# Patient Record
Sex: FEMALE | Race: WHITE | NOT HISPANIC OR LATINO | URBAN - METROPOLITAN AREA
[De-identification: names, ages, dates, MRNs, and addresses within clinical notes are randomized per-mention and may not be internally consistent; named-entity substitution may affect disease eponyms.]

---

## 2017-01-08 ENCOUNTER — INPATIENT (INPATIENT)
Facility: HOSPITAL | Age: 82
LOS: 1 days | Discharge: HOME | DRG: 194 | End: 2017-01-10
Attending: INTERNAL MEDICINE | Admitting: INTERNAL MEDICINE
Payer: MEDICARE

## 2017-01-08 VITALS
TEMPERATURE: 98 F | DIASTOLIC BLOOD PRESSURE: 99 MMHG | OXYGEN SATURATION: 97 % | RESPIRATION RATE: 18 BRPM | HEART RATE: 95 BPM | SYSTOLIC BLOOD PRESSURE: 156 MMHG

## 2017-01-08 LAB
ALBUMIN SERPL ELPH-MCNC: 3.8 G/DL — SIGNIFICANT CHANGE UP (ref 3.4–5)
ALP SERPL-CCNC: 60 U/L — SIGNIFICANT CHANGE UP (ref 40–120)
ALT FLD-CCNC: 33 U/L — SIGNIFICANT CHANGE UP (ref 12–42)
ANION GAP SERPL CALC-SCNC: 8 MMOL/L — LOW (ref 9–16)
APTT BLD: 27.4 SEC — LOW (ref 27.5–37.4)
AST SERPL-CCNC: 56 U/L — HIGH (ref 15–37)
BASOPHILS NFR BLD AUTO: 0.1 % — SIGNIFICANT CHANGE UP (ref 0–2)
BILIRUB SERPL-MCNC: 1 MG/DL — SIGNIFICANT CHANGE UP (ref 0.2–1.2)
BUN SERPL-MCNC: 13 MG/DL — SIGNIFICANT CHANGE UP (ref 7–23)
CALCIUM SERPL-MCNC: 8.6 MG/DL — SIGNIFICANT CHANGE UP (ref 8.5–10.5)
CHLORIDE SERPL-SCNC: 88 MMOL/L — LOW (ref 96–108)
CO2 SERPL-SCNC: 32 MMOL/L — HIGH (ref 22–31)
CREAT SERPL-MCNC: 0.6 MG/DL — SIGNIFICANT CHANGE UP (ref 0.5–1.3)
GLUCOSE SERPL-MCNC: 153 MG/DL — HIGH (ref 70–99)
HCT VFR BLD CALC: 42.1 % — SIGNIFICANT CHANGE UP (ref 34.5–45)
HGB BLD-MCNC: 15.3 G/DL — SIGNIFICANT CHANGE UP (ref 11.5–15.5)
INR BLD: 1.1 — SIGNIFICANT CHANGE UP (ref 0.88–1.16)
LIDOCAIN IGE QN: 164 U/L — SIGNIFICANT CHANGE UP (ref 73–393)
LYMPHOCYTES # BLD AUTO: 2.1 % — LOW (ref 13–44)
MCHC RBC-ENTMCNC: 32.8 PG — SIGNIFICANT CHANGE UP (ref 27–34)
MCHC RBC-ENTMCNC: 36.3 G/DL — HIGH (ref 32–36)
MCV RBC AUTO: 90.3 FL — SIGNIFICANT CHANGE UP (ref 80–100)
MONOCYTES NFR BLD AUTO: 2.6 % — SIGNIFICANT CHANGE UP (ref 2–14)
NEUTROPHILS NFR BLD AUTO: 95.2 % — HIGH (ref 43–77)
NT-PROBNP SERPL-SCNC: 655 PG/ML — HIGH
PLATELET # BLD AUTO: 233 K/UL — SIGNIFICANT CHANGE UP (ref 150–400)
POTASSIUM SERPL-MCNC: 4.2 MMOL/L — SIGNIFICANT CHANGE UP (ref 3.5–5.3)
POTASSIUM SERPL-SCNC: 4.2 MMOL/L — SIGNIFICANT CHANGE UP (ref 3.5–5.3)
PROT SERPL-MCNC: 7.3 G/DL — SIGNIFICANT CHANGE UP (ref 6.4–8.2)
PROTHROM AB SERPL-ACNC: 12.2 SEC — SIGNIFICANT CHANGE UP (ref 10–13.1)
RBC # BLD: 4.66 M/UL — SIGNIFICANT CHANGE UP (ref 3.8–5.2)
RBC # FLD: 12.2 % — SIGNIFICANT CHANGE UP (ref 10.3–16.9)
SODIUM SERPL-SCNC: 128 MMOL/L — LOW (ref 135–145)
TROPONIN I SERPL-MCNC: <0.015 NG/ML — SIGNIFICANT CHANGE UP (ref 0.01–0.04)
WBC # BLD: 22.2 K/UL — HIGH (ref 3.8–10.5)
WBC # FLD AUTO: 22.2 K/UL — HIGH (ref 3.8–10.5)

## 2017-01-08 PROCEDURE — 99285 EMERGENCY DEPT VISIT HI MDM: CPT | Mod: 25

## 2017-01-08 PROCEDURE — 93010 ELECTROCARDIOGRAM REPORT: CPT | Mod: 77

## 2017-01-08 PROCEDURE — 71010: CPT | Mod: 26

## 2017-01-08 RX ORDER — SODIUM CHLORIDE 9 MG/ML
3 INJECTION INTRAMUSCULAR; INTRAVENOUS; SUBCUTANEOUS ONCE
Qty: 0 | Refills: 0 | Status: COMPLETED | OUTPATIENT
Start: 2017-01-08 | End: 2017-01-08

## 2017-01-08 RX ORDER — SODIUM CHLORIDE 9 MG/ML
1000 INJECTION INTRAMUSCULAR; INTRAVENOUS; SUBCUTANEOUS
Qty: 0 | Refills: 0 | Status: DISCONTINUED | OUTPATIENT
Start: 2017-01-08 | End: 2017-01-09

## 2017-01-08 RX ORDER — AZITHROMYCIN 500 MG/1
500 TABLET, FILM COATED ORAL ONCE
Qty: 0 | Refills: 0 | Status: COMPLETED | OUTPATIENT
Start: 2017-01-08 | End: 2017-01-08

## 2017-01-08 RX ORDER — CEFTRIAXONE 500 MG/1
1 INJECTION, POWDER, FOR SOLUTION INTRAMUSCULAR; INTRAVENOUS ONCE
Qty: 0 | Refills: 0 | Status: COMPLETED | OUTPATIENT
Start: 2017-01-08 | End: 2017-01-08

## 2017-01-08 RX ADMIN — SODIUM CHLORIDE 75 MILLILITER(S): 9 INJECTION INTRAMUSCULAR; INTRAVENOUS; SUBCUTANEOUS at 23:42

## 2017-01-08 RX ADMIN — AZITHROMYCIN 255 MILLIGRAM(S): 500 TABLET, FILM COATED ORAL at 23:42

## 2017-01-08 RX ADMIN — SODIUM CHLORIDE 3 MILLILITER(S): 9 INJECTION INTRAMUSCULAR; INTRAVENOUS; SUBCUTANEOUS at 22:55

## 2017-01-08 RX ADMIN — CEFTRIAXONE 100 GRAM(S): 500 INJECTION, POWDER, FOR SOLUTION INTRAMUSCULAR; INTRAVENOUS at 23:10

## 2017-01-08 NOTE — ED PROVIDER NOTE - MEDICAL DECISION MAKING DETAILS
89F with cough, difficulty breathing. Vitals notable for mild tachycardia, mild hypoxia en route. 89F with cough, difficulty breathing. Vitals notable for mild tachycardia, mild hypoxia en route. Likely PNA, 89F with cough, difficulty breathing. Vitals notable for mild tachycardia, mild hypoxia en route. Likely PNA, plan for IV abx after blood cultures, admitted to [facility]. Pt reports chest pain, unclear onset/duration given dementia, EKG shows old LBBB, plan for trop.

## 2017-01-08 NOTE — ED ADULT NURSE NOTE - OBJECTIVE STATEMENT
Pt received to er into room 5. BIBA with HHA at bedside. As per HHA pt has been having some changes in breathing pattern accompanied by a cough. Pt has a hx of dementia. Pt afebrile at this time. Slight wet cough noted. pt A&O x2 to place and person. Pt seems comfortable, not in any respiratory distress. Pt desaturates off of oxygen down to 90's. On oxygen saturation increases. Pt able to move all extremities without difficulty and upon command. AS per HHA radames Chaparro is the HCP waiting for advance directives paperwork from son. Will cont to monitor pt. Pt received to er into room 5. BIBA with HHA at bedside. As per HHA pt has been having some changes in breathing pattern accompanied by a cough. Pt has a hx of dementia. Placed on CM. Pt afebrile at this time. Slight wet cough noted. pt A&O x2 to place and person. Pt seems comfortable, not in any respiratory distress. Pt desaturates off of oxygen down to 90's. On oxygen saturation increases. Pt able to move all extremities without difficulty and upon command. AS per HHA radames Chaparro is the HCP waiting for advance directives paperwork from son. Will cont to monitor pt.

## 2017-01-08 NOTE — ED PROVIDER NOTE - OBJECTIVE STATEMENT
89F with no past medical hx other than dementia, does not take any medications presenting with cough for 1 week, shortness of breath noted today. Pt denies any complaints.

## 2017-01-08 NOTE — ED ADULT TRIAGE NOTE - CHIEF COMPLAINT QUOTE
pt from home by EMS, hx Dementia, according to homeaide difficulty breathing, pox 90% on r/a, NC  3 L 97%, no distress during triage

## 2017-01-09 DIAGNOSIS — E86.0 DEHYDRATION: ICD-10-CM

## 2017-01-09 DIAGNOSIS — I42.9 CARDIOMYOPATHY, UNSPECIFIED: ICD-10-CM

## 2017-01-09 DIAGNOSIS — E87.3 ALKALOSIS: ICD-10-CM

## 2017-01-09 DIAGNOSIS — J18.9 PNEUMONIA, UNSPECIFIED ORGANISM: ICD-10-CM

## 2017-01-09 DIAGNOSIS — R73.9 HYPERGLYCEMIA, UNSPECIFIED: ICD-10-CM

## 2017-01-09 DIAGNOSIS — Z41.8 ENCOUNTER FOR OTHER PROCEDURES FOR PURPOSES OTHER THAN REMEDYING HEALTH STATE: ICD-10-CM

## 2017-01-09 DIAGNOSIS — D72.829 ELEVATED WHITE BLOOD CELL COUNT, UNSPECIFIED: ICD-10-CM

## 2017-01-09 DIAGNOSIS — E87.1 HYPO-OSMOLALITY AND HYPONATREMIA: ICD-10-CM

## 2017-01-09 DIAGNOSIS — R63.8 OTHER SYMPTOMS AND SIGNS CONCERNING FOOD AND FLUID INTAKE: ICD-10-CM

## 2017-01-09 DIAGNOSIS — F03.90 UNSPECIFIED DEMENTIA, UNSPECIFIED SEVERITY, WITHOUT BEHAVIORAL DISTURBANCE, PSYCHOTIC DISTURBANCE, MOOD DISTURBANCE, AND ANXIETY: ICD-10-CM

## 2017-01-09 LAB
ANION GAP SERPL CALC-SCNC: 11 MMOL/L — SIGNIFICANT CHANGE UP (ref 9–16)
ANION GAP SERPL CALC-SCNC: 8 MMOL/L — LOW (ref 9–16)
APPEARANCE UR: (no result)
BILIRUB UR-MCNC: NEGATIVE — SIGNIFICANT CHANGE UP
BUN SERPL-MCNC: 13 MG/DL — SIGNIFICANT CHANGE UP (ref 7–23)
BUN SERPL-MCNC: 14 MG/DL — SIGNIFICANT CHANGE UP (ref 7–23)
CALCIUM SERPL-MCNC: 7.9 MG/DL — LOW (ref 8.5–10.5)
CALCIUM SERPL-MCNC: 8.2 MG/DL — LOW (ref 8.5–10.5)
CHLORIDE SERPL-SCNC: 91 MMOL/L — LOW (ref 96–108)
CHLORIDE SERPL-SCNC: 93 MMOL/L — LOW (ref 96–108)
CK MB CFR SERPL CALC: 5.2 NG/ML — HIGH (ref 0.5–3.6)
CK SERPL-CCNC: 311 U/L — HIGH (ref 26–192)
CO2 SERPL-SCNC: 26 MMOL/L — SIGNIFICANT CHANGE UP (ref 22–31)
CO2 SERPL-SCNC: 32 MMOL/L — HIGH (ref 22–31)
COLOR SPEC: YELLOW — SIGNIFICANT CHANGE UP
CREAT SERPL-MCNC: 0.45 MG/DL — LOW (ref 0.5–1.3)
CREAT SERPL-MCNC: 0.5 MG/DL — SIGNIFICANT CHANGE UP (ref 0.5–1.3)
DIFF PNL FLD: (no result)
GLUCOSE SERPL-MCNC: 76 MG/DL — SIGNIFICANT CHANGE UP (ref 70–99)
GLUCOSE SERPL-MCNC: 83 MG/DL — SIGNIFICANT CHANGE UP (ref 70–99)
GLUCOSE UR QL: NEGATIVE — SIGNIFICANT CHANGE UP
HCT VFR BLD CALC: 39.9 % — SIGNIFICANT CHANGE UP (ref 34.5–45)
HGB BLD-MCNC: 14 G/DL — SIGNIFICANT CHANGE UP (ref 11.5–15.5)
KETONES UR-MCNC: NEGATIVE — SIGNIFICANT CHANGE UP
LEGIONELLA AG UR QL: NEGATIVE — SIGNIFICANT CHANGE UP
LEUKOCYTE ESTERASE UR-ACNC: (no result)
LYMPHOCYTES # BLD AUTO: 2 % — LOW (ref 13–44)
MAGNESIUM SERPL-MCNC: 1.5 MG/DL — LOW (ref 1.6–2.4)
MAGNESIUM SERPL-MCNC: 1.8 MG/DL — SIGNIFICANT CHANGE UP (ref 1.6–2.4)
MCHC RBC-ENTMCNC: 33.7 PG — SIGNIFICANT CHANGE UP (ref 27–34)
MCHC RBC-ENTMCNC: 35.1 G/DL — SIGNIFICANT CHANGE UP (ref 32–36)
MCV RBC AUTO: 95.9 FL — SIGNIFICANT CHANGE UP (ref 80–100)
MONOCYTES NFR BLD AUTO: 1 % — LOW (ref 2–14)
NEUTROPHILS NFR BLD AUTO: 90 % — HIGH (ref 43–77)
NITRITE UR-MCNC: NEGATIVE — SIGNIFICANT CHANGE UP
PH UR: 7 — SIGNIFICANT CHANGE UP (ref 4–8)
PLATELET # BLD AUTO: 164 K/UL — SIGNIFICANT CHANGE UP (ref 150–400)
POTASSIUM SERPL-MCNC: 3.4 MMOL/L — LOW (ref 3.5–5.3)
POTASSIUM SERPL-MCNC: 4.3 MMOL/L — SIGNIFICANT CHANGE UP (ref 3.5–5.3)
POTASSIUM SERPL-SCNC: 3.4 MMOL/L — LOW (ref 3.5–5.3)
POTASSIUM SERPL-SCNC: 4.3 MMOL/L — SIGNIFICANT CHANGE UP (ref 3.5–5.3)
PROT UR-MCNC: NEGATIVE MG/DL — SIGNIFICANT CHANGE UP
RAPID RVP RESULT: SIGNIFICANT CHANGE UP
RBC # BLD: 4.16 M/UL — SIGNIFICANT CHANGE UP (ref 3.8–5.2)
RBC # FLD: 13.4 % — SIGNIFICANT CHANGE UP (ref 10.3–16.9)
SODIUM SERPL-SCNC: 130 MMOL/L — LOW (ref 135–145)
SODIUM SERPL-SCNC: 131 MMOL/L — LOW (ref 135–145)
SP GR SPEC: 1.02 — SIGNIFICANT CHANGE UP (ref 1–1.03)
TROPONIN I SERPL-MCNC: <0.015 NG/ML — SIGNIFICANT CHANGE UP (ref 0.01–0.04)
TSH SERPL-MCNC: 2.01 UIU/ML — SIGNIFICANT CHANGE UP (ref 0.35–4.94)
UROBILINOGEN FLD QL: 0.2 E.U./DL — SIGNIFICANT CHANGE UP
WBC # BLD: 25.8 K/UL — HIGH (ref 3.8–10.5)
WBC # FLD AUTO: 25.8 K/UL — HIGH (ref 3.8–10.5)

## 2017-01-09 PROCEDURE — 93010 ELECTROCARDIOGRAM REPORT: CPT

## 2017-01-09 RX ORDER — POTASSIUM CHLORIDE 20 MEQ
40 PACKET (EA) ORAL ONCE
Qty: 0 | Refills: 0 | Status: COMPLETED | OUTPATIENT
Start: 2017-01-09 | End: 2017-01-09

## 2017-01-09 RX ORDER — SODIUM CHLORIDE 9 MG/ML
1000 INJECTION INTRAMUSCULAR; INTRAVENOUS; SUBCUTANEOUS
Qty: 0 | Refills: 0 | Status: DISCONTINUED | OUTPATIENT
Start: 2017-01-09 | End: 2017-01-09

## 2017-01-09 RX ORDER — AZITHROMYCIN 500 MG/1
250 TABLET, FILM COATED ORAL EVERY 24 HOURS
Qty: 0 | Refills: 0 | Status: DISCONTINUED | OUTPATIENT
Start: 2017-01-09 | End: 2017-01-09

## 2017-01-09 RX ORDER — CEFTRIAXONE 500 MG/1
1 INJECTION, POWDER, FOR SOLUTION INTRAMUSCULAR; INTRAVENOUS EVERY 24 HOURS
Qty: 0 | Refills: 0 | Status: DISCONTINUED | OUTPATIENT
Start: 2017-01-09 | End: 2017-01-10

## 2017-01-09 RX ORDER — SODIUM CHLORIDE 9 MG/ML
1000 INJECTION, SOLUTION INTRAVENOUS
Qty: 0 | Refills: 0 | Status: DISCONTINUED | OUTPATIENT
Start: 2017-01-09 | End: 2017-01-09

## 2017-01-09 RX ORDER — SODIUM CHLORIDE 9 MG/ML
1000 INJECTION INTRAMUSCULAR; INTRAVENOUS; SUBCUTANEOUS
Qty: 0 | Refills: 0 | Status: DISCONTINUED | OUTPATIENT
Start: 2017-01-09 | End: 2017-01-10

## 2017-01-09 RX ORDER — MAGNESIUM SULFATE 500 MG/ML
2 VIAL (ML) INJECTION
Qty: 0 | Refills: 0 | Status: COMPLETED | OUTPATIENT
Start: 2017-01-09 | End: 2017-01-09

## 2017-01-09 RX ORDER — HEPARIN SODIUM 5000 [USP'U]/ML
5000 INJECTION INTRAVENOUS; SUBCUTANEOUS EVERY 12 HOURS
Qty: 0 | Refills: 0 | Status: DISCONTINUED | OUTPATIENT
Start: 2017-01-09 | End: 2017-01-10

## 2017-01-09 RX ADMIN — SODIUM CHLORIDE 60 MILLILITER(S): 9 INJECTION, SOLUTION INTRAVENOUS at 04:53

## 2017-01-09 RX ADMIN — HEPARIN SODIUM 5000 UNIT(S): 5000 INJECTION INTRAVENOUS; SUBCUTANEOUS at 07:19

## 2017-01-09 RX ADMIN — Medication 40 MILLIEQUIVALENT(S): at 05:06

## 2017-01-09 RX ADMIN — HEPARIN SODIUM 5000 UNIT(S): 5000 INJECTION INTRAVENOUS; SUBCUTANEOUS at 18:04

## 2017-01-09 RX ADMIN — Medication 50 GRAM(S): at 09:23

## 2017-01-09 RX ADMIN — Medication 50 GRAM(S): at 07:19

## 2017-01-09 RX ADMIN — Medication 100 MILLIGRAM(S): at 23:08

## 2017-01-09 RX ADMIN — CEFTRIAXONE 100 GRAM(S): 500 INJECTION, POWDER, FOR SOLUTION INTRAMUSCULAR; INTRAVENOUS at 23:16

## 2017-01-09 NOTE — DIETITIAN INITIAL EVALUATION ADULT. - OTHER INFO
pt admitted for metabolic acidosis. pt is currently on Regular diet. RN is unsure how much pt has been consuming as diet order was just placed today and prior RN reports pt was NPO. RN reports that pt passed bedside dysphagia screen; no reported issues chewing/swallowing or GI distress reported @ this time per RN. Skin: pt with stage II Sacrum PU. NKFA per EMR. High Nutrition Risk.

## 2017-01-09 NOTE — H&P ADULT. - RS GEN PE MLT RESP DETAILS PC
clear to auscultation bilaterally/good air movement/no intercostal retractions/respirations non-labored/airway patent

## 2017-01-09 NOTE — DIETITIAN INITIAL EVALUATION ADULT. - ENERGY NEEDS
Height: 4 feet 5 inches, Weight (Current): 75 pounds, IBW 86 pounds +/-10%, %IBW %87, BMI 18.8    current body wt used for energy calculations as pt falls within % IBW  EER based on PU.

## 2017-01-09 NOTE — H&P ADULT. - CONSTITUTIONAL COMMENTS
cachectic appearing elderly female, very hard of hearing, following commands appropriately, in no distress, profoundly dry.

## 2017-01-09 NOTE — CHART NOTE - NSCHARTNOTEFT_GEN_A_CORE
Spoke with Shankar CASTILLO and he has documentation for DNR/DNI with no artificial life support including nutrition/feeding tube orders that he plans on bringing in later in the evening. Patient will be DNR/DNI.

## 2017-01-09 NOTE — H&P ADULT. - EXTREMITIES COMMENTS
extensive scaling with what appears to be color changes consistent with venous stasis b/l, with multiple healing ulcers/wounds at different stages without any acute appearing lesion.

## 2017-01-09 NOTE — H&P ADULT. - PROBLEM SELECTOR PLAN 2
Cannot rule out underlying infection. CXR difficult to assess and  urine workup pending.  - Ceftriaxone azithromycin for now; f/u culture and sensitivity, UA, RVP  - Legionella ag given possible pna with hyponatremia, soft stool Cannot rule out underlying infection. CXR difficult to assess and  urine workup pending. possible sources also include sacral ulcer and viral etiology.  - Ceftriaxone azithromycin for now; f/u culture and sensitivity, UA, RVP  - Legionella ag given possible pna with hyponatremia, soft stool

## 2017-01-09 NOTE — PROGRESS NOTE ADULT - PROBLEM SELECTOR PLAN 4
Idiopathic Cardiomyopathy with unknown EF. No need for further work up at this time as patient is very frail and would avoid medications and treatments.

## 2017-01-09 NOTE — H&P ADULT. - HISTORY OF PRESENT ILLNESS
89 F PMHx significant for Sinus arrhythmia, LBBB, advanced dementia brought in by home health aide after pt complained of cough, SOB starting yesterday with associated weakness. The patient, although very hard of hearing, is able to deny acute pain, and admits to a good appetite At baseline the pt is assisted out of bed with assist but is non ambulatory. No recent hospitalizations, fever, chills, vomiting, nausea, diarrhea, decreased PO intake per Aide, who has known her 5 years.    ED Vitals: 98.0 / 95 / 156/99 / 18 / 97%  ED Adminisration: Azithromycin, Ceftriaxone, 1 L NS 89 F PMHx significant for Sinus arrhythmia, LBBB, advanced dementia with 24hr HHA brought in by home health aide after pt complained of cough, SOB starting yesterday with associated weakness. The patient, although very hard of hearing, is able to deny acute pain, and admits to a good appetite At baseline the pt is assisted out of bed with assist but is non ambulatory. No recent hospitalizations, fever, chills, vomiting, nausea, diarrhea, decreased PO intake per Aide, who has known her 5 years.    Spoke with HCP Shankar at [phone] who was able to confirm DNR, was unclear about intubation, who will bring paperwork tomorrow. He was also able to confirm that the patient is not taking any Rx medications.    ED Vitals: 98.0 / 95 / 156/99 / 18 / 97%  ED Adminisration: Azithromycin, Ceftriaxone, 1 L NS

## 2017-01-09 NOTE — DIETITIAN INITIAL EVALUATION ADULT. - PROBLEM SELECTOR PLAN 2
Cannot rule out underlying infection. CXR difficult to assess and  urine workup pending. possible sources also include sacral ulcer and viral etiology.  - Ceftriaxone azithromycin for now; f/u culture and sensitivity, UA, RVP  - Legionella ag given possible pna with hyponatremia, soft stool

## 2017-01-09 NOTE — H&P ADULT. - PROBLEM SELECTOR PLAN 1
Suspect contraction alkalosis due to profound dehydration in the setting of clinical findings and hyponatremia.  - Urine chloride to narrow DDx

## 2017-01-09 NOTE — ADVANCED PRACTICE NURSE CONSULT - REASON FOR CONSULT
WOC nurse consult to assess stage 2 sacral pressure ulcer (injury). Patient is a 89 F PMHx significant for sinus arrhythmia, LBBB, advanced dementia with 24hr HHA brought in by home health aide after pt complained of cough, SOB starting  with associated weakness.

## 2017-01-09 NOTE — H&P ADULT. - COMMENTS
limited/unreliable ros as pt with dementia limited/unreliable ros as pt with dementia    +++ Stage 2-3 pressure ulcer 3x4cm

## 2017-01-09 NOTE — ADVANCED PRACTICE NURSE CONSULT - ASSESSMENT
Patient refused assessment of sacral ulcer (injury) stating "leave me alone". Informed RN, Joann and MADISYNMDhara of patient's refusal. Consult can be requested again if patient/family allows assessment.

## 2017-01-10 VITALS
RESPIRATION RATE: 17 BRPM | TEMPERATURE: 98 F | HEART RATE: 76 BPM | DIASTOLIC BLOOD PRESSURE: 93 MMHG | OXYGEN SATURATION: 95 % | SYSTOLIC BLOOD PRESSURE: 143 MMHG

## 2017-01-10 LAB
ANION GAP SERPL CALC-SCNC: 9 MMOL/L — SIGNIFICANT CHANGE UP (ref 9–16)
BUN SERPL-MCNC: 12 MG/DL — SIGNIFICANT CHANGE UP (ref 7–23)
CALCIUM SERPL-MCNC: 8.2 MG/DL — LOW (ref 8.5–10.5)
CHLORIDE SERPL-SCNC: 98 MMOL/L — SIGNIFICANT CHANGE UP (ref 96–108)
CO2 SERPL-SCNC: 26 MMOL/L — SIGNIFICANT CHANGE UP (ref 22–31)
CREAT SERPL-MCNC: 0.37 MG/DL — LOW (ref 0.5–1.3)
GLUCOSE SERPL-MCNC: 66 MG/DL — LOW (ref 70–99)
HCT VFR BLD CALC: 37.3 % — SIGNIFICANT CHANGE UP (ref 34.5–45)
HGB BLD-MCNC: 12.9 G/DL — SIGNIFICANT CHANGE UP (ref 11.5–15.5)
MAGNESIUM SERPL-MCNC: 2.5 MG/DL — HIGH (ref 1.6–2.4)
MCHC RBC-ENTMCNC: 32.7 PG — SIGNIFICANT CHANGE UP (ref 27–34)
MCHC RBC-ENTMCNC: 34.6 G/DL — SIGNIFICANT CHANGE UP (ref 32–36)
MCV RBC AUTO: 94.7 FL — SIGNIFICANT CHANGE UP (ref 80–100)
PLATELET # BLD AUTO: 200 K/UL — SIGNIFICANT CHANGE UP (ref 150–400)
POTASSIUM SERPL-MCNC: 4.7 MMOL/L — SIGNIFICANT CHANGE UP (ref 3.5–5.3)
POTASSIUM SERPL-SCNC: 4.7 MMOL/L — SIGNIFICANT CHANGE UP (ref 3.5–5.3)
RBC # BLD: 3.94 M/UL — SIGNIFICANT CHANGE UP (ref 3.8–5.2)
RBC # FLD: 12.8 % — SIGNIFICANT CHANGE UP (ref 10.3–16.9)
SODIUM SERPL-SCNC: 133 MMOL/L — LOW (ref 135–145)
WBC # BLD: 14.8 K/UL — HIGH (ref 3.8–10.5)
WBC # FLD AUTO: 14.8 K/UL — HIGH (ref 3.8–10.5)

## 2017-01-10 PROCEDURE — 85730 THROMBOPLASTIN TIME PARTIAL: CPT

## 2017-01-10 PROCEDURE — 93005 ELECTROCARDIOGRAM TRACING: CPT

## 2017-01-10 PROCEDURE — 80048 BASIC METABOLIC PNL TOTAL CA: CPT

## 2017-01-10 PROCEDURE — 83880 ASSAY OF NATRIURETIC PEPTIDE: CPT

## 2017-01-10 PROCEDURE — 80053 COMPREHEN METABOLIC PANEL: CPT

## 2017-01-10 PROCEDURE — 87449 NOS EACH ORGANISM AG IA: CPT

## 2017-01-10 PROCEDURE — 96375 TX/PRO/DX INJ NEW DRUG ADDON: CPT

## 2017-01-10 PROCEDURE — 87633 RESP VIRUS 12-25 TARGETS: CPT

## 2017-01-10 PROCEDURE — 81001 URINALYSIS AUTO W/SCOPE: CPT

## 2017-01-10 PROCEDURE — 83735 ASSAY OF MAGNESIUM: CPT

## 2017-01-10 PROCEDURE — 85025 COMPLETE CBC W/AUTO DIFF WBC: CPT

## 2017-01-10 PROCEDURE — 96374 THER/PROPH/DIAG INJ IV PUSH: CPT

## 2017-01-10 PROCEDURE — 87486 CHLMYD PNEUM DNA AMP PROBE: CPT

## 2017-01-10 PROCEDURE — 87186 SC STD MICRODIL/AGAR DIL: CPT

## 2017-01-10 PROCEDURE — 36415 COLL VENOUS BLD VENIPUNCTURE: CPT

## 2017-01-10 PROCEDURE — 87798 DETECT AGENT NOS DNA AMP: CPT

## 2017-01-10 PROCEDURE — 82803 BLOOD GASES ANY COMBINATION: CPT

## 2017-01-10 PROCEDURE — 99285 EMERGENCY DEPT VISIT HI MDM: CPT | Mod: 25

## 2017-01-10 PROCEDURE — 87040 BLOOD CULTURE FOR BACTERIA: CPT

## 2017-01-10 PROCEDURE — 87086 URINE CULTURE/COLONY COUNT: CPT

## 2017-01-10 PROCEDURE — 85610 PROTHROMBIN TIME: CPT

## 2017-01-10 PROCEDURE — 83690 ASSAY OF LIPASE: CPT

## 2017-01-10 PROCEDURE — 82550 ASSAY OF CK (CPK): CPT

## 2017-01-10 PROCEDURE — 84443 ASSAY THYROID STIM HORMONE: CPT

## 2017-01-10 PROCEDURE — 85027 COMPLETE CBC AUTOMATED: CPT

## 2017-01-10 PROCEDURE — 82553 CREATINE MB FRACTION: CPT

## 2017-01-10 PROCEDURE — 84484 ASSAY OF TROPONIN QUANT: CPT

## 2017-01-10 PROCEDURE — 71045 X-RAY EXAM CHEST 1 VIEW: CPT

## 2017-01-10 PROCEDURE — 81003 URINALYSIS AUTO W/O SCOPE: CPT

## 2017-01-10 PROCEDURE — 87581 M.PNEUMON DNA AMP PROBE: CPT

## 2017-01-10 RX ORDER — CIPROFLOXACIN LACTATE 400MG/40ML
1 VIAL (ML) INTRAVENOUS
Qty: 5 | Refills: 0 | OUTPATIENT
Start: 2017-01-10 | End: 2017-01-15

## 2017-01-10 RX ADMIN — HEPARIN SODIUM 5000 UNIT(S): 5000 INJECTION INTRAVENOUS; SUBCUTANEOUS at 17:06

## 2017-01-10 RX ADMIN — Medication 100 MILLIGRAM(S): at 12:46

## 2017-01-10 RX ADMIN — HEPARIN SODIUM 5000 UNIT(S): 5000 INJECTION INTRAVENOUS; SUBCUTANEOUS at 07:30

## 2017-01-10 NOTE — PROGRESS NOTE ADULT - PROBLEM SELECTOR PLAN 2
Chronic, compensated - she has not needed a diuretic for years
Compensated.
Patient was very dry on admission despite HHA reporting that she has been eating and drinking well at home. May be secondary to diarrhea. Mild hypovolemic hyponatremia.  -  cc/hr decrease to 75cc/hr with history of cardiomyopathy with unknown EF

## 2017-01-10 NOTE — DISCHARGE NOTE ADULT - HOSPITAL COURSE
89F PMHx of dementia, cardiomyopathy presented from home with shortness of breath. She had a questionable infiltrate on her CXR in the LLL, a leukocytosis of [id number], requiring oxygen. She was started on Ceftriaxone and Doxycycline in the hospital. Her leukocytosis improved and she no longer required oxygen. She was discharged home on 5 days of levaquin with plans to follow up with Dr. Corona. She is hemodynamically stable for discharge.

## 2017-01-10 NOTE — PROGRESS NOTE ADULT - PROBLEM SELECTOR PLAN 1
Community acquired pneumonia likely cause of acute symptoms - she has responded remarkably well to current antibiotics.  No fever but WBC quite elevated.  Would switch to oral antibiotics - recheck WBC to confirm downward trend.  If medically stable tomorrow she can go home.  Discussed plan with her son Shankar and housestaff
No cough, lungs clear, WBC down.  Switch to levofloxacin - okay to send home today
Unclear cause of leukocytosis as patient has no complaints and vitals are stable. Possible LLL pleural effusion and SOB at home. Treating for CAP. Neutrophil predominance.  - Ceftriaxone 1g daily and Doxycycline 100mg q12h (discussed with pharmacy and this is good option for patient with cardiomyopathy)  - f/u UA for possible urinary source of leukocytosis  - legionella negative  - If doing well can discharge on PO Abx Cefdinir and Doxy

## 2017-01-10 NOTE — PROGRESS NOTE ADULT - SUBJECTIVE AND OBJECTIVE BOX
Sleepy but no specific complaints although her memory is poor - no cough    VITAL SIGNS:  T(F): 97.6  HR: 72  BP: 124/87  RR: 17  SpO2: 94%  Wt(kg): --    PHYSICAL EXAM:  Constitutional: No acute distress appears comfortable  Eyes: left eye closed  Neck: supple, no JVD  Respiratory: decreased breath sounds in all lung fields but no rales or ronchi  Cardiovascular: regular rhythm, III/VI holo systolic murmur at the LSB  Gastrointestinal: soft, normal BS  Extremities: severe statis dermatitis with 1+ edema  Vascular: pulses palpable in all 4 extremities    LABS  WBC down to 14.8       MEDICATIONS  (STANDING):  heparin  Injectable 5000Unit(s) SubCutaneous every 12 hours  cefTRIAXone   IVPB 1Gram(s) IV Intermittent every 24 hours  sodium chloride 0.9%. 1000milliLiter(s) IV Continuous <Continuous>  doxycycline hyclate Capsule 100milliGRAM(s) Oral every 12 hours
Feeling okay today.  No specific complaints although her memory is poor - no cough, no burning with urination, no abdominal pain, no leg pain.    VITAL SIGNS:  T(F): 98.3  HR: 63  BP: 132/69  RR: 17  SpO2: 94%  Wt(kg): --    PHYSICAL EXAM:  Constitutional: No acute distress appears comfortable  Eyes: left eye closed  Neck: supple, no JVD  Respiratory: decreased breath sounds in all lung fields but no rales or ronchi  Cardiovascular: regular rhythm, II/VI systolic ejection type murmur at the LSB  Gastrointestinal: soft, normal BS  Extremities: severe statis dermatitis with 1+ edema  Vascular: pulses palpable in all 4 extremities    LABS  WBC 25.8 - 90% neutrophils  Na+ 130 (up from 128)  Creatinine kinase 311 but troponin <0.015  UA 5-0 WBC but probably traumatic cath    MEDICATIONS  (STANDING):  heparin  Injectable 5000Unit(s) SubCutaneous every 12 hours  cefTRIAXone   IVPB 1Gram(s) IV Intermittent every 24 hours  sodium chloride 0.9%. 1000milliLiter(s) IV Continuous <Continuous>  doxycycline hyclate Capsule 100milliGRAM(s) Oral every 12 hours
INTERVAL HPI/OVERNIGHT EVENTS:  LA NENA overnight. Patient without complaints this morning. HHA reports that patient is acting like her usual self. No cough, no dysuria, no abdominal pain, no leg pain.    Aid reports that patient was home in usual state of health. She ate dinner and became nauseous and had some regurgitation but no vomiting and then had diarrhea and had SOB. She was brought into the hospital for the SOB.     VITAL SIGNS:  T(F): 98.9  HR: 67  BP: 109/68  RR: 17  SpO2: 97%  Wt(kg): --    PHYSICAL EXAM:    Constitutional: appears well, non-toxic  Eyes: PERRL, EOMI  ENMT: MMM  Neck: supple, no JVD  Respiratory: decreased breath sounds in all lung fields  Cardiovascular: RRR, no MRG  Gastrointestinal: soft, NTND, normal BS  Extremities: purpuric rash in b/l lower extremities with overlying keratosis, b/l 2+ pitting edema in the feet  Vascular: pulses palpable in all 4 extremities  Neurological: A&Ox2 (oriented to self and president but not to place or time)    MEDICATIONS  (STANDING):  heparin  Injectable 5000Unit(s) SubCutaneous every 12 hours  cefTRIAXone   IVPB 1Gram(s) IV Intermittent every 24 hours  sodium chloride 0.9%. 1000milliLiter(s) IV Continuous <Continuous>  doxycycline hyclate Capsule 100milliGRAM(s) Oral every 12 hours    MEDICATIONS  (PRN):      Allergies    penicillins (Short breath)    Intolerances        LABS:                        14.0   25.8  )-----------( 164      ( 09 Jan 2017 07:55 )             39.9     09 Jan 2017 07:55    130    |  93     |  13     ----------------------------<  76     4.3     |  26     |  0.45     Ca    7.9        09 Jan 2017 07:55  Mg     1.8       09 Jan 2017 07:55    TPro  7.3    /  Alb  3.8    /  TBili  1.0    /  DBili  x      /  AST  56     /  ALT  33     /  AlkPhos  60     08 Jan 2017 22:59    PT/INR - ( 08 Jan 2017 22:59 )   PT: 12.2 sec;   INR: 1.10          PTT - ( 08 Jan 2017 22:59 )  PTT:27.4 sec      RADIOLOGY & ADDITIONAL TESTS:

## 2017-01-10 NOTE — PROGRESS NOTE ADULT - PROBLEM SELECTOR PROBLEM 3
Dementia without behavioral disturbance, unspecified dementia type
Leukocytosis, unspecified type
Dementia without behavioral disturbance, unspecified dementia type

## 2017-01-10 NOTE — DISCHARGE NOTE ADULT - MEDICATION SUMMARY - MEDICATIONS TO TAKE
I will START or STAY ON the medications listed below when I get home from the hospital:    Levaquin 500 mg oral tablet  -- 1 tab(s) by mouth once a day  -- Avoid prolonged or excessive exposure to direct and/or artificial sunlight while taking this medication.  Do not take dairy products, antacids, or iron preparations within one hour of this medication.  Finish all this medication unless otherwise directed by prescriber.  May cause drowsiness or dizziness.  Medication should be taken with plenty of water.    -- Indication: For Pneumonia

## 2017-01-10 NOTE — DISCHARGE NOTE ADULT - PLAN OF CARE
follow up You had some shortness of breath at home that we determined was due to pneumonia. You should continue taking the antibiotics as prescribed for 5 days. Please follow up. Continue care at home and follow up with your doctor. No medications at this time. Follow up.

## 2017-01-10 NOTE — DISCHARGE NOTE ADULT - NS MD DC FALL RISK RISK
For information on Fall & Injury Prevention, visit [email or web address]

## 2017-01-10 NOTE — DISCHARGE NOTE ADULT - PATIENT PORTAL LINK FT
“You can access the Follow[employer] Patient Portal, offered by [facility], by registering with the following website: [email or web address]”

## 2017-01-10 NOTE — DISCHARGE NOTE ADULT - CARE PLAN
Principal Discharge DX:	Pneumonia of left lung due to infectious organism, unspecified part of lung  Goal:	follow up  Instructions for follow-up, activity and diet:	You had some shortness of breath at home that we determined was due to pneumonia. You should continue taking the antibiotics as prescribed for 5 days. Please follow up.  Secondary Diagnosis:	Dementia without behavioral disturbance, unspecified dementia type  Instructions for follow-up, activity and diet:	Continue care at home and follow up with your doctor.  Secondary Diagnosis:	Cardiomyopathy, unspecified type  Instructions for follow-up, activity and diet:	No medications at this time. Follow up.

## 2017-01-10 NOTE — DISCHARGE NOTE ADULT - CARE PROVIDER_API CALL
Santos Corona), Cardiovascular Disease; Internal Medicine  [address] [place] [address]  Phone: [phone]  Fax: [phone]

## 2017-01-10 NOTE — PROGRESS NOTE ADULT - PROBLEM SELECTOR PLAN 3
Alert, cheerful, wry - her usual self.  She has been managing well with full time home health aides
Resolving
No medications at home. patient is pleasantly demented. Continue to monitor and orient as needed.

## 2017-01-11 LAB
-  AMPICILLIN/SULBACTAM: SIGNIFICANT CHANGE UP
-  AMPICILLIN: SIGNIFICANT CHANGE UP
-  CEFAZOLIN: SIGNIFICANT CHANGE UP
-  CEFTRIAXONE: SIGNIFICANT CHANGE UP
-  CIPROFLOXACIN: SIGNIFICANT CHANGE UP
-  GENTAMICIN: SIGNIFICANT CHANGE UP
-  NITROFURANTOIN: SIGNIFICANT CHANGE UP
-  PIPERACILLIN/TAZOBACTAM: SIGNIFICANT CHANGE UP
-  TOBRAMYCIN: SIGNIFICANT CHANGE UP
-  TRIMETHOPRIM/SULFAMETHOXAZOLE: SIGNIFICANT CHANGE UP
CULTURE RESULTS: SIGNIFICANT CHANGE UP
METHOD TYPE: SIGNIFICANT CHANGE UP
ORGANISM # SPEC MICROSCOPIC CNT: SIGNIFICANT CHANGE UP
ORGANISM # SPEC MICROSCOPIC CNT: SIGNIFICANT CHANGE UP
SPECIMEN SOURCE: SIGNIFICANT CHANGE UP

## 2017-01-12 DIAGNOSIS — L89.92 PRESSURE ULCER OF UNSPECIFIED SITE, STAGE 2: ICD-10-CM

## 2017-01-12 DIAGNOSIS — R09.02 HYPOXEMIA: ICD-10-CM

## 2017-01-12 DIAGNOSIS — Z66 DO NOT RESUSCITATE: ICD-10-CM

## 2017-01-12 DIAGNOSIS — J18.9 PNEUMONIA, UNSPECIFIED ORGANISM: ICD-10-CM

## 2017-01-12 DIAGNOSIS — Z88.0 ALLERGY STATUS TO PENICILLIN: ICD-10-CM

## 2017-01-12 DIAGNOSIS — Z74.01 BED CONFINEMENT STATUS: ICD-10-CM

## 2017-01-12 DIAGNOSIS — E87.3 ALKALOSIS: ICD-10-CM

## 2017-01-12 DIAGNOSIS — I42.8 OTHER CARDIOMYOPATHIES: ICD-10-CM

## 2017-01-12 DIAGNOSIS — F03.90 UNSPECIFIED DEMENTIA, UNSPECIFIED SEVERITY, WITHOUT BEHAVIORAL DISTURBANCE, PSYCHOTIC DISTURBANCE, MOOD DISTURBANCE, AND ANXIETY: ICD-10-CM

## 2017-01-12 DIAGNOSIS — I87.2 VENOUS INSUFFICIENCY (CHRONIC) (PERIPHERAL): ICD-10-CM

## 2017-01-12 DIAGNOSIS — I44.7 LEFT BUNDLE-BRANCH BLOCK, UNSPECIFIED: ICD-10-CM

## 2017-01-12 DIAGNOSIS — E86.0 DEHYDRATION: ICD-10-CM

## 2017-01-12 DIAGNOSIS — R73.9 HYPERGLYCEMIA, UNSPECIFIED: ICD-10-CM

## 2017-01-12 DIAGNOSIS — E87.1 HYPO-OSMOLALITY AND HYPONATREMIA: ICD-10-CM

## 2017-01-12 DIAGNOSIS — Z87.891 PERSONAL HISTORY OF NICOTINE DEPENDENCE: ICD-10-CM

## 2017-01-12 DIAGNOSIS — R05 COUGH: ICD-10-CM

## 2017-01-12 DIAGNOSIS — L89.159 PRESSURE ULCER OF SACRAL REGION, UNSPECIFIED STAGE: ICD-10-CM

## 2017-01-13 DIAGNOSIS — L89.152 PRESSURE ULCER OF SACRAL REGION, STAGE 2: ICD-10-CM

## 2017-01-14 LAB
CULTURE RESULTS: SIGNIFICANT CHANGE UP
CULTURE RESULTS: SIGNIFICANT CHANGE UP
SPECIMEN SOURCE: SIGNIFICANT CHANGE UP
SPECIMEN SOURCE: SIGNIFICANT CHANGE UP

## 2017-08-14 ENCOUNTER — INPATIENT (INPATIENT)
Facility: HOSPITAL | Age: 82
LOS: 2 days | Discharge: HOSPICE | DRG: 871 | End: 2017-08-17
Attending: INTERNAL MEDICINE | Admitting: INTERNAL MEDICINE
Payer: MEDICARE

## 2017-08-14 VITALS
RESPIRATION RATE: 18 BRPM | OXYGEN SATURATION: 84 % | DIASTOLIC BLOOD PRESSURE: 67 MMHG | SYSTOLIC BLOOD PRESSURE: 96 MMHG | HEART RATE: 88 BPM

## 2017-08-14 DIAGNOSIS — R63.8 OTHER SYMPTOMS AND SIGNS CONCERNING FOOD AND FLUID INTAKE: ICD-10-CM

## 2017-08-14 DIAGNOSIS — A41.9 SEPSIS, UNSPECIFIED ORGANISM: ICD-10-CM

## 2017-08-14 DIAGNOSIS — N39.0 URINARY TRACT INFECTION, SITE NOT SPECIFIED: ICD-10-CM

## 2017-08-14 DIAGNOSIS — F03.90 UNSPECIFIED DEMENTIA, UNSPECIFIED SEVERITY, WITHOUT BEHAVIORAL DISTURBANCE, PSYCHOTIC DISTURBANCE, MOOD DISTURBANCE, AND ANXIETY: ICD-10-CM

## 2017-08-14 DIAGNOSIS — Z29.9 ENCOUNTER FOR PROPHYLACTIC MEASURES, UNSPECIFIED: ICD-10-CM

## 2017-08-14 LAB
ALBUMIN SERPL ELPH-MCNC: 3.9 G/DL — SIGNIFICANT CHANGE UP (ref 3.3–5)
ALP SERPL-CCNC: 105 U/L — SIGNIFICANT CHANGE UP (ref 40–120)
ALT FLD-CCNC: 31 U/L — SIGNIFICANT CHANGE UP (ref 10–45)
ANION GAP SERPL CALC-SCNC: 18 MMOL/L — HIGH (ref 5–17)
APPEARANCE UR: (no result)
APTT BLD: 26.5 SEC — LOW (ref 27.5–37.4)
AST SERPL-CCNC: 41 U/L — HIGH (ref 10–40)
BACTERIA # UR AUTO: PRESENT /HPF
BILIRUB SERPL-MCNC: 1 MG/DL — SIGNIFICANT CHANGE UP (ref 0.2–1.2)
BILIRUB UR-MCNC: (no result)
BUN SERPL-MCNC: 60 MG/DL — HIGH (ref 7–23)
CALCIUM SERPL-MCNC: 9.1 MG/DL — SIGNIFICANT CHANGE UP (ref 8.4–10.5)
CHLORIDE SERPL-SCNC: 96 MMOL/L — SIGNIFICANT CHANGE UP (ref 96–108)
CO2 SERPL-SCNC: 29 MMOL/L — SIGNIFICANT CHANGE UP (ref 22–31)
COLOR SPEC: YELLOW — SIGNIFICANT CHANGE UP
COMMENT - URINE: SIGNIFICANT CHANGE UP
CREAT SERPL-MCNC: 1 MG/DL — SIGNIFICANT CHANGE UP (ref 0.5–1.3)
DIFF PNL FLD: (no result)
EPI CELLS # UR: SIGNIFICANT CHANGE UP /HPF
EXTRA SST TUBE: SIGNIFICANT CHANGE UP
GLUCOSE SERPL-MCNC: 178 MG/DL — HIGH (ref 70–99)
GLUCOSE UR QL: NEGATIVE — SIGNIFICANT CHANGE UP
HCT VFR BLD CALC: 42.3 % — SIGNIFICANT CHANGE UP (ref 34.5–45)
HGB BLD-MCNC: 14.3 G/DL — SIGNIFICANT CHANGE UP (ref 11.5–15.5)
INR BLD: 1.09 — SIGNIFICANT CHANGE UP (ref 0.88–1.16)
KETONES UR-MCNC: (no result) MG/DL
LACTATE SERPL-SCNC: 2.5 MMOL/L — HIGH (ref 0.5–2)
LEUKOCYTE ESTERASE UR-ACNC: NEGATIVE — SIGNIFICANT CHANGE UP
LG PLATELETS BLD QL AUTO: PRESENT — SIGNIFICANT CHANGE UP
LYMPHOCYTES # BLD AUTO: 2 % — LOW (ref 13–44)
MAGNESIUM SERPL-MCNC: 2.4 MG/DL — SIGNIFICANT CHANGE UP (ref 1.6–2.6)
MANUAL SMEAR VERIFICATION: SIGNIFICANT CHANGE UP
MCHC RBC-ENTMCNC: 32.6 PG — SIGNIFICANT CHANGE UP (ref 27–34)
MCHC RBC-ENTMCNC: 33.8 G/DL — SIGNIFICANT CHANGE UP (ref 32–36)
MCV RBC AUTO: 96.6 FL — SIGNIFICANT CHANGE UP (ref 80–100)
MONOCYTES NFR BLD AUTO: 4 % — SIGNIFICANT CHANGE UP (ref 2–14)
NEUTROPHILS NFR BLD AUTO: 78 % — HIGH (ref 43–77)
NEUTS BAND # BLD: 16 % — HIGH
NITRITE UR-MCNC: POSITIVE
PH UR: 5 — SIGNIFICANT CHANGE UP (ref 5–8)
PHOSPHATE SERPL-MCNC: 4.9 MG/DL — HIGH (ref 2.5–4.5)
PLAT MORPH BLD: (no result)
PLATELET # BLD AUTO: 223 K/UL — SIGNIFICANT CHANGE UP (ref 150–400)
POTASSIUM SERPL-MCNC: 4.9 MMOL/L — SIGNIFICANT CHANGE UP (ref 3.5–5.3)
POTASSIUM SERPL-SCNC: 4.9 MMOL/L — SIGNIFICANT CHANGE UP (ref 3.5–5.3)
PROT SERPL-MCNC: 7.2 G/DL — SIGNIFICANT CHANGE UP (ref 6–8.3)
PROT UR-MCNC: 30 MG/DL
PROTHROM AB SERPL-ACNC: 12.1 SEC — SIGNIFICANT CHANGE UP (ref 9.8–12.7)
RBC # BLD: 4.38 M/UL — SIGNIFICANT CHANGE UP (ref 3.8–5.2)
RBC # FLD: 14.3 % — SIGNIFICANT CHANGE UP (ref 10.3–16.9)
RBC BLD AUTO: NORMAL — SIGNIFICANT CHANGE UP
RBC CASTS # UR COMP ASSIST: < 5 /HPF — SIGNIFICANT CHANGE UP
SODIUM SERPL-SCNC: 143 MMOL/L — SIGNIFICANT CHANGE UP (ref 135–145)
SP GR SPEC: 1.02 — SIGNIFICANT CHANGE UP (ref 1–1.03)
UROBILINOGEN FLD QL: 1 E.U./DL — SIGNIFICANT CHANGE UP
WBC # BLD: 19 K/UL — HIGH (ref 3.8–10.5)
WBC # FLD AUTO: 19 K/UL — HIGH (ref 3.8–10.5)
WBC UR QL: > 10 /HPF

## 2017-08-14 PROCEDURE — 93010 ELECTROCARDIOGRAM REPORT: CPT | Mod: 76

## 2017-08-14 PROCEDURE — 99291 CRITICAL CARE FIRST HOUR: CPT | Mod: 25

## 2017-08-14 PROCEDURE — 71010: CPT | Mod: 26

## 2017-08-14 PROCEDURE — 74000: CPT | Mod: 26

## 2017-08-14 RX ORDER — SODIUM CHLORIDE 9 MG/ML
500 INJECTION INTRAMUSCULAR; INTRAVENOUS; SUBCUTANEOUS ONCE
Qty: 0 | Refills: 0 | Status: COMPLETED | OUTPATIENT
Start: 2017-08-14 | End: 2017-08-14

## 2017-08-14 RX ORDER — ONDANSETRON 8 MG/1
4 TABLET, FILM COATED ORAL ONCE
Qty: 0 | Refills: 0 | Status: COMPLETED | OUTPATIENT
Start: 2017-08-14 | End: 2017-08-14

## 2017-08-14 RX ORDER — PANTOPRAZOLE SODIUM 20 MG/1
40 TABLET, DELAYED RELEASE ORAL
Qty: 0 | Refills: 0 | Status: DISCONTINUED | OUTPATIENT
Start: 2017-08-15 | End: 2017-08-16

## 2017-08-14 RX ORDER — HEPARIN SODIUM 5000 [USP'U]/ML
5000 INJECTION INTRAVENOUS; SUBCUTANEOUS EVERY 12 HOURS
Qty: 0 | Refills: 0 | Status: DISCONTINUED | OUTPATIENT
Start: 2017-08-14 | End: 2017-08-14

## 2017-08-14 RX ORDER — CEFTRIAXONE 500 MG/1
1 INJECTION, POWDER, FOR SOLUTION INTRAMUSCULAR; INTRAVENOUS ONCE
Qty: 0 | Refills: 0 | Status: COMPLETED | OUTPATIENT
Start: 2017-08-14 | End: 2017-08-14

## 2017-08-14 RX ORDER — PANTOPRAZOLE SODIUM 20 MG/1
80 TABLET, DELAYED RELEASE ORAL ONCE
Qty: 0 | Refills: 0 | Status: COMPLETED | OUTPATIENT
Start: 2017-08-14 | End: 2017-08-14

## 2017-08-14 RX ORDER — ACETAMINOPHEN 500 MG
650 TABLET ORAL EVERY 6 HOURS
Qty: 0 | Refills: 0 | Status: DISCONTINUED | OUTPATIENT
Start: 2017-08-14 | End: 2017-08-17

## 2017-08-14 RX ORDER — PANTOPRAZOLE SODIUM 20 MG/1
8 TABLET, DELAYED RELEASE ORAL
Qty: 80 | Refills: 0 | Status: DISCONTINUED | OUTPATIENT
Start: 2017-08-14 | End: 2017-08-14

## 2017-08-14 RX ORDER — ACETAMINOPHEN 500 MG
650 TABLET ORAL ONCE
Qty: 0 | Refills: 0 | Status: COMPLETED | OUTPATIENT
Start: 2017-08-14 | End: 2017-08-14

## 2017-08-14 RX ORDER — CEFTRIAXONE 500 MG/1
1 INJECTION, POWDER, FOR SOLUTION INTRAMUSCULAR; INTRAVENOUS EVERY 24 HOURS
Qty: 0 | Refills: 0 | Status: DISCONTINUED | OUTPATIENT
Start: 2017-08-15 | End: 2017-08-16

## 2017-08-14 RX ORDER — SODIUM CHLORIDE 9 MG/ML
1000 INJECTION INTRAMUSCULAR; INTRAVENOUS; SUBCUTANEOUS
Qty: 0 | Refills: 0 | Status: DISCONTINUED | OUTPATIENT
Start: 2017-08-14 | End: 2017-08-17

## 2017-08-14 RX ADMIN — PANTOPRAZOLE SODIUM 80 MILLIGRAM(S): 20 TABLET, DELAYED RELEASE ORAL at 15:54

## 2017-08-14 RX ADMIN — PANTOPRAZOLE SODIUM 10 MG/HR: 20 TABLET, DELAYED RELEASE ORAL at 16:20

## 2017-08-14 RX ADMIN — SODIUM CHLORIDE 50 MILLILITER(S): 9 INJECTION INTRAMUSCULAR; INTRAVENOUS; SUBCUTANEOUS at 21:47

## 2017-08-14 RX ADMIN — Medication 650 MILLIGRAM(S): at 18:24

## 2017-08-14 RX ADMIN — ONDANSETRON 4 MILLIGRAM(S): 8 TABLET, FILM COATED ORAL at 15:47

## 2017-08-14 RX ADMIN — SODIUM CHLORIDE 500 MILLILITER(S): 9 INJECTION INTRAMUSCULAR; INTRAVENOUS; SUBCUTANEOUS at 16:22

## 2017-08-14 RX ADMIN — SODIUM CHLORIDE 500 MILLILITER(S): 9 INJECTION INTRAMUSCULAR; INTRAVENOUS; SUBCUTANEOUS at 18:24

## 2017-08-14 RX ADMIN — CEFTRIAXONE 100 GRAM(S): 500 INJECTION, POWDER, FOR SOLUTION INTRAMUSCULAR; INTRAVENOUS at 19:13

## 2017-08-14 NOTE — ED PROVIDER NOTE - OBJECTIVE STATEMENT
history of dementia, brought in by HHA with episode of vomiting.  Noted to be dark in appearance/ coffee ground like.  Patient unable to provide any further history due to dementia.  Per phone conversation with POA son, patient DNR/DNI.

## 2017-08-14 NOTE — ED PROVIDER NOTE - CARE PLAN
Principal Discharge DX:	UTI (urinary tract infection)  Secondary Diagnosis:	Coffee ground emesis  Secondary Diagnosis:	Sepsis

## 2017-08-14 NOTE — H&P ADULT - PROBLEM SELECTOR PLAN 6
Hold HSQ in setting of possible UGIB    Dispo: Admit to [facility] for IV antibiotics   DNR/DNI Hold HSQ in setting of possible UGIB  Palliative in AM for possible home hospice     Dispo: Admit to [facility] for IV antibiotics.   DNR/DNI

## 2017-08-14 NOTE — ED PROVIDER NOTE - CONSTITUTIONAL, MLM
normal... cachectic elderly female, awake, alert, oriented x0, in nad with small amount of coffee ground emesis on shirt

## 2017-08-14 NOTE — H&P ADULT - NSHPPHYSICALEXAM_GEN_ALL_CORE
.  VITAL SIGNS:  T(C): 37.8 (08-14-17 @ 19:15), Max: 38.3 (08-14-17 @ 15:45)  T(F): 100 (08-14-17 @ 19:15), Max: 100.9 (08-14-17 @ 15:45)  HR: 70 (08-14-17 @ 19:15) (70 - 88)  BP: 103/59 (08-14-17 @ 19:15) (96/67 - 109/61)  BP(mean): --  RR: 16 (08-14-17 @ 19:15) (16 - 18)  SpO2: 98% (08-14-17 @ 19:15) (84% - 98%)  Wt(kg): --    PHYSICAL EXAM:    Constitutional: frail, cachectic, lying in bed, grimacing, NAD  Head: NC/AT  Eyes: unable to assess as pt resistant   ENT: poor dentition, dry MMM  Neck: supple  Respiratory: CTA B/L; no W/R/R, no retractions  Cardiac: +S1/S2; RRR; +systolic murmur best heard in mitral area   Gastrointestinal: soft, NT/ND; no rebound or guarding; +BSx4  Genitourinary: normal external genitalia, +suprapubic tenderness to palpation   Extremities: WWP, no clubbing or cyanosis; no peripheral edema  Musculoskeletal: NROM x4; no joint swelling, tenderness or erythema  Vascular: 2+ radial, femoral, DP/PT pulses B/L  Dermatologic: skin warm, dry and intact; scattered ecchymosis of UE/LE  Neurologic: CNII-XII grossly intact; no focal deficits, not following commands   Psychiatric: affect and characteristics of appearance, verbalizations, behaviors are appropriate

## 2017-08-14 NOTE — ED PROVIDER NOTE - MEDICAL DECISION MAKING DETAILS
episode of vomiting, apparent coffee ground emesis. low grade fever, elevated wbc, lactate, uti.  suspect uti/urosepsis > vomiting.  will treat with ivf, ceftriaxone, protonix.  trend cbc.  dnr/dni, papers in chart. discussed with pmd Dr. Corona

## 2017-08-14 NOTE — ED PROVIDER NOTE - CRITICAL CARE PROVIDED
direct patient care (not related to procedure)/documentation/interpretation of diagnostic studies/consultation with other physicians/conducted a detailed discussion of DNR status/consult w/ pt's family directly relating to pts condition/additional history taking

## 2017-08-14 NOTE — H&P ADULT - NSHPLABSRESULTS_GEN_ALL_CORE
.  LABS:                         14.3   19.0  )-----------( 223      ( 14 Aug 2017 15:48 )             42.3         143  |  96  |  60<H>  ----------------------------<  178<H>  4.9   |  29  |  1.00    Ca    9.1      14 Aug 2017 15:48    TPro  7.2  /  Alb  3.9  /  TBili  1.0  /  DBili  x   /  AST  41<H>  /  ALT  31  /  AlkPhos  105      PT/INR - ( 14 Aug 2017 15:48 )   PT: 12.1 sec;   INR: 1.09          PTT - ( 14 Aug 2017 15:48 )  PTT:26.5 sec  Urinalysis Basic - ( 14 Aug 2017 18:26 )    Color: Yellow / Appearance: Hazy / S.025 / pH: x  Gluc: x / Ketone: Trace mg/dL  / Bili: Moderate / Urobili: 1.0 E.U./dL   Blood: x / Protein: 30 mg/dL / Nitrite: POSITIVE   Leuk Esterase: NEGATIVE / RBC: < 5 /HPF / WBC > 10 /HPF   Sq Epi: x / Non Sq Epi: Few /HPF / Bacteria: Present /HPF            Lactate, Blood: 2.5 mmoL/L ( @ 18:06)      RADIOLOGY, EKG & ADDITIONAL TESTS: Reviewed.

## 2017-08-14 NOTE — H&P ADULT - PROBLEM SELECTOR PLAN 4
Pt with history of dementia. A&O x1 at baseline. Pt with history of dementia, mostly bedbound, minimally responsive at baseline.   -Currently responds to pain only, likely in setting of severe sepsis.

## 2017-08-14 NOTE — H&P ADULT - PROBLEM SELECTOR PLAN 1
Patient meeting severe sepsis criteria with leukocytosis 19 and bandemia 16, lactic acidosis, temperature of 100.9 and altered from baseline. Likely source urine and UA positive for WBC and nitrites. Patient meeting severe sepsis criteria with leukocytosis 19 and bandemia 16, lactic acidosis, temperature of 100.9 and altered from baseline. Likely source urine and UA positive for WBC and nitrites. CXR w/no acute infiltrates   -Ceftriaxone 1g   -f/u blood cultures  -f/u urine cultures  -Tylenol 650mg suppository for temp >100.4  -Bladder scan, straight cath for residual urine Patient meeting severe sepsis criteria with leukocytosis 19 and bandemia 16, lactic acidosis, temperature of 100.9 and altered from baseline. Likely source urine and UA positive for WBC and nitrites. CXR w/no acute infiltrates   -Ceftriaxone 1g x3 days   -f/u blood cultures  -f/u urine cultures  -Tylenol 650mg suppository for temp >100.4  -Bladder scan, straight cath for residual urine Patient meeting severe sepsis criteria with leukocytosis 19 and bandemia 16, lactic acidosis, temperature of 100.9 and altered from baseline. Likely source urine and UA positive for WBC and nitrites. CXR w/no acute infiltrates   -Ceftriaxone 1g x3 days   -f/u blood cultures  -f/u urine cultures  -Tylenol 650mg suppository for temp >100.4  -Bladder scan, straight cath for residual urine  -Son agrees to current plan, wants mother to be comfortable. Will limit blood draws.   -Palliative consult in AM for possible home hospice.

## 2017-08-14 NOTE — H&P ADULT - HISTORY OF PRESENT ILLNESS
89F from home with 24HHA, w/PMH of dementia, brought in by HHA with episode of vomiting. Per ED documentation, patient had an episode of NBNB vomiting yesterday and then today noted to be dark in appearance/coffee ground like.  Patient unable to provide any further history due to dementia. Per PMD and MOLST, patient is DNR/DNI.    In ED, VS: Tmax 100.9, HR 70-83, /59, RR 16, SpO2 98% on room air. WBC 19, bands 16, lactate 2.5, +UA. Pt given Ceftriaxone and started on Protonix gtt for possible GIB. 89F from home with 24HHA, w/PMH of dementia, brought in by HHA with episode of vomiting. Per ED documentation, patient had an episode of NBNB vomiting yesterday and then today noted to be dark in appearance/coffee ground like.  Patient unable to provide any further history due to dementia. Per sonShankar (HCP) pt has had a few strokes in the past and has been bedbound for the past 2 years, poor quality of life. He would like the patient to receive IV antibiotics, however no aggressive measures. Pt is DNR/DNI.     In ED, VS: Tmax 100.9, HR 70-83, /59, RR 16, SpO2 98% on room air. WBC 19, bands 16, lactate 2.5, +UA. Pt given Ceftriaxone and started on Protonix gtt for possible GIB.

## 2017-08-14 NOTE — H&P ADULT - PROBLEM SELECTOR PLAN 3
Patient with 1 episode of coffee ground emesis per HHA at home. No more episodes of vomitus since admission.  -Hb stable  -IV protonix 40mg BID  -Will monitor for now, Patient with 1 episode of coffee ground emesis per HHA at home. No more episodes of vomitus since admission. Pt HD stable.   -Hb stable  -IV protonix 40mg BID  -Will monitor for now Patient with 1 episode of coffee ground emesis per HHA at home. No more episodes of vomitus since admission. Pt HD stable.   -Hb stable  -Repeat CBC in AM   -IV protonix 40mg BID  -Will monitor for now Patient with 1 episode of coffee ground emesis per HHA at home. No more episodes of vomitus since admission. Pt HD stable.   -Hb stable  -Repeat CBC with AM labs  -IV protonix 40mg BID  -Will monitor for now  -Per discussion with son who is HCP, limited blood draws, no heroic measures. His mother has had a long life and he would like her to be as comfortable as possible.

## 2017-08-14 NOTE — ED PROVIDER NOTE - DIAGNOSTIC INTERPRETATION
CXR: rotated, no focal consolidation, normal bones, cardiomegaly.  read by Dr. Momo knapp xray: non obstructive bowel gas pattern, no free air, excess stool in vault, bilateral hip replacements read by Dr. Barr

## 2017-08-14 NOTE — H&P ADULT - ASSESSMENT
89F from home with 24HHA, w/PMH of dementia, brought in by HHA with episode of vomiting and worsening mental status, admitted for severe sepsis 2/2 UTI and ?GIB.

## 2017-08-15 DIAGNOSIS — K92.0 HEMATEMESIS: ICD-10-CM

## 2017-08-15 DIAGNOSIS — R79.9 ABNORMAL FINDING OF BLOOD CHEMISTRY, UNSPECIFIED: ICD-10-CM

## 2017-08-15 DIAGNOSIS — N39.0 URINARY TRACT INFECTION, SITE NOT SPECIFIED: ICD-10-CM

## 2017-08-15 DIAGNOSIS — Z51.5 ENCOUNTER FOR PALLIATIVE CARE: ICD-10-CM

## 2017-08-15 DIAGNOSIS — R53.2 FUNCTIONAL QUADRIPLEGIA: ICD-10-CM

## 2017-08-15 DIAGNOSIS — R64 CACHEXIA: ICD-10-CM

## 2017-08-15 DIAGNOSIS — E43 UNSPECIFIED SEVERE PROTEIN-CALORIE MALNUTRITION: ICD-10-CM

## 2017-08-15 DIAGNOSIS — Z78.9 OTHER SPECIFIED HEALTH STATUS: ICD-10-CM

## 2017-08-15 DIAGNOSIS — R73.9 HYPERGLYCEMIA, UNSPECIFIED: ICD-10-CM

## 2017-08-15 DIAGNOSIS — Z71.89 OTHER SPECIFIED COUNSELING: ICD-10-CM

## 2017-08-15 DIAGNOSIS — R52 PAIN, UNSPECIFIED: ICD-10-CM

## 2017-08-15 DIAGNOSIS — Z66 DO NOT RESUSCITATE: ICD-10-CM

## 2017-08-15 LAB
-  COAGULASE NEGATIVE STAPHYLOCOCCUS: SIGNIFICANT CHANGE UP
GRAM STN FLD: SIGNIFICANT CHANGE UP
METHOD TYPE: SIGNIFICANT CHANGE UP
PREALB SERPL-MCNC: 10 MG/DL — LOW (ref 20–40)

## 2017-08-15 PROCEDURE — 99497 ADVNCD CARE PLAN 30 MIN: CPT | Mod: 25,GC

## 2017-08-15 PROCEDURE — 99223 1ST HOSP IP/OBS HIGH 75: CPT

## 2017-08-15 RX ADMIN — PANTOPRAZOLE SODIUM 40 MILLIGRAM(S): 20 TABLET, DELAYED RELEASE ORAL at 17:37

## 2017-08-15 RX ADMIN — CEFTRIAXONE 100 GRAM(S): 500 INJECTION, POWDER, FOR SOLUTION INTRAMUSCULAR; INTRAVENOUS at 18:50

## 2017-08-15 RX ADMIN — PANTOPRAZOLE SODIUM 40 MILLIGRAM(S): 20 TABLET, DELAYED RELEASE ORAL at 05:12

## 2017-08-15 NOTE — CONSULT NOTE ADULT - PROBLEM SELECTOR RECOMMENDATION 7
Support provided to patient and family. Patient to have access to supportive services during rest of hospital stay as the patient/family deemed necessary ie. Chaplaincy, Massage therapy, Music therapy, Patient and family supportive services, Palliative SW, etc.

## 2017-08-15 NOTE — PROGRESS NOTE ADULT - SUBJECTIVE AND OBJECTIVE BOX
O/N Events: LA NENA overnight  Subjective/ROS: INTERVAL EVENTS: Pt is unable to report subjective findings due to baseline dementia (responsive to name and pain only).    HPI: 89 yo woman with PMHx dementia, was brought in with HHA with 1 episode of NBNB vomiting, 1 episode of dark, "coffee-ground" appearing vomiting. In ED, VS: Tmax 100.9, HR 70-83, /59, RR 16, SpO2 98% on room air. WBC 19, bands 16, lactate 2.5, +UA. Pt given Ceftriaxone and started on Protonix gtt for possible GIB.      VITALS  Vital Signs Last 24 Hrs  T(C): 36.2 (15 Aug 2017 05:07), Max: 38.3 (14 Aug 2017 15:45)  T(F): 97.1 (15 Aug 2017 05:07), Max: 100.9 (14 Aug 2017 15:45)  HR: 68 (15 Aug 2017 05:07) (68 - 88)  BP: 109/78 (15 Aug 2017 05:07) (96/67 - 109/78)  BP(mean): --  RR: 17 (15 Aug 2017 05:07) (16 - 18)  SpO2: 97% (15 Aug 2017 05:07) (84% - 98%)    PHYSICAL EXAM  General: A&Ox1, responsive to name and pain only; cachectic appearing  Head: NC/AT; MMM; PERRL; EOMI;  Neck: Supple; no JVD  Respiratory: CTA B/L; no wheezes/crackles   Cardiovascular: Regular rhythm/rate; S1/S2; III/IV holosystolic murmur audible throughout, most prominent in mitral area   Gastrointestinal: Soft; ND; suprapubic tenderness  Extremities: WWP; no edema/cyanosis; left knee enlarged, venous stasis dermatitis in all 4 extremities    MEDICATIONS  (STANDING):  cefTRIAXone   IVPB 1 Gram(s) IV Intermittent every 24 hours  sodium chloride 0.9%. 1000 milliLiter(s) (50 mL/Hr) IV Continuous <Continuous>  pantoprazole  Injectable 40 milliGRAM(s) IV Push two times a day    MEDICATIONS  (PRN):  acetaminophen  Suppository 650 milliGRAM(s) Rectal every 6 hours PRN For Temp greater than 38 C (100.4 F)    ALLERGIES  Levaquin (Rash)  penicillins (Short breath)      LABS                        14.3   19.0  )-----------( 223      ( 14 Aug 2017 15:48 )             42.3     08-14    143  |  96  |  60<H>  ----------------------------<  178<H>  4.9   |  29  |  1.00    Ca    9.1      14 Aug 2017 15:48  Phos  4.9     -  Mg     2.4     -    TPro  7.2  /  Alb  3.9  /  TBili  1.0  /  DBili  x   /  AST  41<H>  /  ALT  31  /  AlkPhos  105  08-14    PT/INR - ( 14 Aug 2017 15:48 )   PT: 12.1 sec;   INR: 1.09          PTT - ( 14 Aug 2017 15:48 )  PTT:26.5 sec  Urinalysis Basic - ( 14 Aug 2017 18:26 )    Color: Yellow / Appearance: Hazy / S.025 / pH: x  Gluc: x / Ketone: Trace mg/dL  / Bili: Moderate / Urobili: 1.0 E.U./dL   Blood: x / Protein: 30 mg/dL / Nitrite: POSITIVE   Leuk Esterase: NEGATIVE / RBC: < 5 /HPF / WBC > 10 /HPF   Sq Epi: x / Non Sq Epi: Few /HPF / Bacteria: Present /HPF 89 yo woman with PMHx dementia, was brought in with [facility] with 1 episode of NBNB vomiting, 1 episode of dark, "coffee-ground" appearing vomiting admitted for severe sepsis 2/2 to UTI.     O/N Events: LA NENA overnight  Subjective/ROS: INTERVAL EVENTS: Pt is unable to report subjective findings due to baseline dementia (responsive to name and pain only).    VITALS  Vital Signs Last 24 Hrs  T(C): 36.2 (15 Aug 2017 05:07), Max: 38.3 (14 Aug 2017 15:45)  T(F): 97.1 (15 Aug 2017 05:07), Max: 100.9 (14 Aug 2017 15:45)  HR: 68 (15 Aug 2017 05:07) (68 - 88)  BP: 109/78 (15 Aug 2017 05:07) (96/67 - 109/78)  BP(mean): --  RR: 17 (15 Aug 2017 05:07) (16 - 18)  SpO2: 97% (15 Aug 2017 05:07) (84% - 98%)    PHYSICAL EXAM  General: A&Ox1, responsive to name and pain only; cachectic appearing  Head: NC/AT; MMM; PERRL; EOMI;  Neck: Supple; no JVD  Respiratory: CTA B/L; no wheezes/crackles   Cardiovascular: Regular rhythm/rate; S1/S2; III/IV holosystolic murmur audible throughout, most prominent in mitral area   Gastrointestinal: Soft; ND; suprapubic tenderness  Extremities: WWP; no edema/cyanosis; left knee enlarged, venous stasis dermatitis in all 4 extremities    MEDICATIONS  (STANDING):  cefTRIAXone   IVPB 1 Gram(s) IV Intermittent every 24 hours  sodium chloride 0.9%. 1000 milliLiter(s) (50 mL/Hr) IV Continuous <Continuous>  pantoprazole  Injectable 40 milliGRAM(s) IV Push two times a day    MEDICATIONS  (PRN):  acetaminophen  Suppository 650 milliGRAM(s) Rectal every 6 hours PRN For Temp greater than 38 C (100.4 F)    ALLERGIES  Levaquin (Rash)  penicillins (Short breath)      LABS                        14.3   19.0  )-----------( 223      ( 14 Aug 2017 15:48 )             42.3     08-14    143  |  96  |  60<H>  ----------------------------<  178<H>  4.9   |  29  |  1.00    Ca    9.1      14 Aug 2017 15:48  Phos  4.9     -  Mg     2.4         TPro  7.2  /  Alb  3.9  /  TBili  1.0  /  DBili  x   /  AST  41<H>  /  ALT  31  /  AlkPhos  105  -    PT/INR - ( 14 Aug 2017 15:48 )   PT: 12.1 sec;   INR: 1.09          PTT - ( 14 Aug 2017 15:48 )  PTT:26.5 sec  Urinalysis Basic - ( 14 Aug 2017 18:26 )    Color: Yellow / Appearance: Hazy / S.025 / pH: x  Gluc: x / Ketone: Trace mg/dL  / Bili: Moderate / Urobili: 1.0 E.U./dL   Blood: x / Protein: 30 mg/dL / Nitrite: POSITIVE   Leuk Esterase: NEGATIVE / RBC: < 5 /HPF / WBC > 10 /HPF   Sq Epi: x / Non Sq Epi: Few /HPF / Bacteria: Present /HPF

## 2017-08-15 NOTE — DIETITIAN INITIAL EVALUATION ADULT. - OTHER INFO
nutrition hx , weight hx n/a ,  vomiting  , + ruff ,  DNR / DNI - followed by pallative - goals of care to be established  bedbound x2 years  , skin -  stage 1 heel , stage 1 rt elbow , ecchymotic , bruised  , sacrum stage 2

## 2017-08-15 NOTE — CONSULT NOTE ADULT - SUBJECTIVE AND OBJECTIVE BOX
CURLY GARCIA   MRN-[id number]    : 1919     HPI:  89F from home with 24HHA, w/PMH of dementia, brought in by HHA with episode of vomiting. Per ED documentation, patient had an episode of NBNB vomiting yesterday and then today noted to be dark in appearance/coffee ground like.  Patient unable to provide any further history due to dementia. Per son, Shankar (HCP) pt has had a few strokes in the past and has been bedbound for the past 2 years, poor quality of life. He would like the patient to receive IV antibiotics, however no aggressive measures. Pt is DNR/DNI.   Noted with sepsis 2/2 UTI, treatment with Levaquin    In ED, VS: Tmax 100.9, HR 70-83, /59, RR 16, SpO2 98% on room air. WBC 19, bands 16, lactate 2.5, +UA. Pt given Ceftriaxone and started on Protonix gtt for possible GIB. (14 Aug 2017 20:02)      PAST MEDICAL & SURGICAL HISTORY:  Dementia  CVA      FAMILY HISTORY: unknown      ROS:    Unable to attain due to:  severe confusion                    Dyspnea (Lyle 0-10):  0                      N/V (Y/N): N                             Secretions (Y/N) : N           Agitation(Y/N): N  Pain (Y/N): RLQ pain on light palpation unable to qualify and quantify 2/2 confusion/dementia      Allergies    Levaquin (Rash)  penicillins (Short breath)    Intolerances    Opiate Naive (Y/N): unknown      Medications:      MEDICATIONS  (STANDING):  cefTRIAXone   IVPB 1 Gram(s) IV Intermittent every 24 hours  sodium chloride 0.9%. 1000 milliLiter(s) (50 mL/Hr) IV Continuous <Continuous>  pantoprazole  Injectable 40 milliGRAM(s) IV Push two times a day    MEDICATIONS  (PRN):  acetaminophen  Suppository 650 milliGRAM(s) Rectal every 6 hours PRN For Temp greater than 38 C (100.4 F)      Labs:    CBC:                        14.3   19.0  )-----------( 223      ( 14 Aug 2017 15:48 )             42.3     CMP:        143  |  96  |  60<H>  ----------------------------<  178<H>  4.9   |  29  |  1.00    Ca    9.1      14 Aug 2017 15:48  Phos  4.9     -  Mg     2.4     -    TPro  7.2  /  Alb  3.9  /  TBili  1.0  /  DBili  x   /  AST  41<H>  /  ALT  31  /  AlkPhos  105  08-14    PT/INR - ( 14 Aug 2017 15:48 )   PT: 12.1 sec;   INR: 1.09          PTT - ( 14 Aug 2017 15:48 )  PTT:26.5 sec  Urinalysis Basic - ( 14 Aug 2017 18:26 )    Color: Yellow / Appearance: Hazy / S.025 / pH: x  Gluc: x / Ketone: Trace mg/dL  / Bili: Moderate / Urobili: 1.0 E.U./dL   Blood: x / Protein: 30 mg/dL / Nitrite: POSITIVE   Leuk Esterase: NEGATIVE / RBC: < 5 /HPF / WBC > 10 /HPF   Sq Epi: x / Non Sq Epi: Few /HPF / Bacteria: Present /HPF    Imaging:  No imaging done given [facility]    PEx:  T(C): 36.2 (08-15-17 @ 08:47), Max: 38.3 (17 @ 15:45)  HR: 70 (08-15-17 @ 08:47) (68 - 88)  BP: 109/72 (08-15-17 @ 08:47) (96/67 - 109/78)  RR: 16 (08-15-17 @ 08:47) (16 - 18)  SpO2: 99% (08-15-17 @ 08:47) (84% - 99%)  Wt(kg): --24.3  Daily Height in cm: 152.4 (14 Aug 2017 20:53)    Daily   CAPILLARY BLOOD GLUCOSE        I&O's Summary    14 Aug 2017 07:  -  15 Aug 2017 07:00  --------------------------------------------------------  IN: 500 mL / OUT: 0 mL / NET: 500 mL    15 Aug 2017 07:01  -  15 Aug 2017 13:21  --------------------------------------------------------  IN: 50 mL / OUT: 0 mL / NET: 50 mL        General: elderly cachectic female in bed c/o RLQ   HEENT: A/T N/C, poor dentition, [place], MM dry with tongue cracked   Neck: supple, no JVD  CVS: S1S2, III/VI  holosystolic murmur most prominent at apex, no gallop  Resp: CTA B/L no RRW   GI: soft, mild tenderness, + BS x 4    : + suprapubic tenderness   Musc: weakness    Neuro: alert to name, knew she was in hospital but couldn't state which one, no focal deficits  Psych: calm, confused, combative at times     Skin: B/L UE/LE eccyhymosis, dsg to left knee  Lymph: NO LAD  Preadmit Karnofsky:  30%       Current Karnofsky:    30 %  Cachexia (Y/N): Y  BMI: 10.5    Advanced Directives:     DNR/DNI     MOLST      Decision maker: radames Pisano   Legal surrogate: radames Pisano  Of note: pt states she has a daughter but can not remember her name    Social History: lives with 24 hr HHA, further history to be obtained when able to contact son    GOALS OF CARE DISCUSSION       Palliative care info/counseling provided	           Documentation of GOC: needing further collateral from family,? hospice appropriateness	          REFERRALS	        Palliative Med        Unit SW/Case Mgmt       Hospice       Nutrition CURLY GARCIA   MRN-[id number]    : 1919     HPI:  89F from home with 24HHA, w/PMH of dementia, brought in by HHA with episode of vomiting. Per ED documentation, patient had an episode of NBNB vomiting yesterday and then today noted to be dark in appearance/coffee ground like.  Patient unable to provide any further history due to dementia. Per son, Shankar (HCP) pt has had a few strokes in the past and has been bedbound for the past 2 years, poor quality of life. He would like the patient to receive IV antibiotics, however no aggressive measures. Pt is DNR/DNI.   Noted with sepsis 2/2 UTI, treatment with Levaquin    In ED, VS: Tmax 100.9, HR 70-83, /59, RR 16, SpO2 98% on room air. WBC 19, bands 16, lactate 2.5, +UA. Pt given Ceftriaxone and started on Protonix gtt for possible GIB. (14 Aug 2017 20:02)      PAST MEDICAL & SURGICAL HISTORY:  Dementia  CVA      FAMILY HISTORY: unknown      ROS:    Unable to attain due to:  severe confusion                    Dyspnea (Lyle 0-10):  0                      N/V (Y/N): N                             Secretions (Y/N) : N           Agitation(Y/N): N  Pain (Y/N): RLQ pain on light palpation unable to qualify and quantify 2/2 confusion/dementia      Allergies    Levaquin (Rash)  penicillins (Short breath)    Intolerances    Opiate Naive (Y/N): unknown      Medications:      MEDICATIONS  (STANDING):  cefTRIAXone   IVPB 1 Gram(s) IV Intermittent every 24 hours  sodium chloride 0.9%. 1000 milliLiter(s) (50 mL/Hr) IV Continuous <Continuous>  pantoprazole  Injectable 40 milliGRAM(s) IV Push two times a day    MEDICATIONS  (PRN):  acetaminophen  Suppository 650 milliGRAM(s) Rectal every 6 hours PRN For Temp greater than 38 C (100.4 F)      Labs:    CBC:                        14.3   19.0  )-----------( 223      ( 14 Aug 2017 15:48 )             42.3     CMP:        143  |  96  |  60<H>  ----------------------------<  178<H>  4.9   |  29  |  1.00    Ca    9.1      14 Aug 2017 15:48  Phos  4.9     -  Mg     2.4     -    TPro  7.2  /  Alb  3.9  /  TBili  1.0  /  DBili  x   /  AST  41<H>  /  ALT  31  /  AlkPhos  105  08-14    PT/INR - ( 14 Aug 2017 15:48 )   PT: 12.1 sec;   INR: 1.09          PTT - ( 14 Aug 2017 15:48 )  PTT:26.5 sec  Urinalysis Basic - ( 14 Aug 2017 18:26 )    Color: Yellow / Appearance: Hazy / S.025 / pH: x  Gluc: x / Ketone: Trace mg/dL  / Bili: Moderate / Urobili: 1.0 E.U./dL   Blood: x / Protein: 30 mg/dL / Nitrite: POSITIVE   Leuk Esterase: NEGATIVE / RBC: < 5 /HPF / WBC > 10 /HPF   Sq Epi: x / Non Sq Epi: Few /HPF / Bacteria: Present /HPF    Imaging:  No imaging done given [facility]    PEx:  T(C): 36.2 (08-15-17 @ 08:47), Max: 38.3 (17 @ 15:45)  HR: 70 (08-15-17 @ 08:47) (68 - 88)  BP: 109/72 (08-15-17 @ 08:47) (96/67 - 109/78)  RR: 16 (08-15-17 @ 08:47) (16 - 18)  SpO2: 99% (08-15-17 @ 08:47) (84% - 99%)  Wt(kg): --24.3  Daily Height in cm: 152.4 (14 Aug 2017 20:53)    Daily   CAPILLARY BLOOD GLUCOSE        I&O's Summary    14 Aug 2017 07:  -  15 Aug 2017 07:00  --------------------------------------------------------  IN: 500 mL / OUT: 0 mL / NET: 500 mL    15 Aug 2017 07:01  -  15 Aug 2017 13:21  --------------------------------------------------------  IN: 50 mL / OUT: 0 mL / NET: 50 mL        General: elderly cachectic female in bed c/o RLQ   HEENT: A/T N/C, poor dentition, [place], MM dry with tongue cracked   Neck: supple, no JVD  CVS: S1S2, III/VI  holosystolic murmur most prominent at apex, no gallop  Resp: CTA B/L no RRW   GI: soft, mild tenderness, + BS x 4    : + suprapubic tenderness   Musc: weakness    Neuro: alert to name, knew she was in hospital but couldn't state which one, no focal deficits  Psych: calm, confused, combative at times     Skin: B/L UE/LE eccyhymosis, dsg to left knee  Lymph: NO LAD  Preadmit Karnofsky:  30%       Current Karnofsky:    30 %  Cachexia (Y/N): Y  BMI: 10.5    Advanced Directives:     DNR/DNI     MOLST      Decision maker: radames Pisano (o) [phone] or [phone] (c):[phone]  Legal surrogate: radames Pisano     Social History: retired teacher,  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

## 2017-08-15 NOTE — PROGRESS NOTE ADULT - PROBLEM SELECTOR PLAN 5
Glucose was 178, no hx of diabetes. Likely hyperglycemia in the setting of infection.  - continue to treat infection, continue appropriate nutrition Glucose was 178, no hx of diabetes. Likely hyperglycemia in the setting of infection.  - continue to treat infection, continue appropriate nutrition  - no sliding scale at this time, will monitor glucose level and determine if needed.

## 2017-08-15 NOTE — CONSULT NOTE ADULT - PROBLEM SELECTOR RECOMMENDATION 8
Advance care planning meeting  Start time: 1:30 PM  End time: 2:00 PM  Total time: 30 min    A face to face meeting to discuss advance care planning was held today regarding: CURLY GARCIA  Primary decision maker: radames Chaparro  Alternate/surrogate:  Discussed advance directives including, but not limited to, healthcare proxy and code status.  Decision regarding code status: DNR/DNI  Documentation completed today: Spoke with son who states precipidous decline in patient functional status over the past 2 years. He states that pt began with HA 3-4 hrs approx 8 years ago, with full time aide for past 3 years. Pt is presently unable to provide for any of her ADL's independently and is now incontinent of urine and feces. Son states that she has become more confused over the past few months, often not recognizing her son, other times believes that he is her  . He claims that "this is no quality of life" and that given his mothers active lifestyle in her prime, she would never want to be maintained on "machines" and that she would "wish to be let to die peacefully" He does wish to treat  with antibiotics presently in hopes that  it will make her more comfortable Referral placed for HH

## 2017-08-15 NOTE — CHART NOTE - NSCHARTNOTEFT_GEN_A_CORE
Was notified by the nurse that the anaerobic bottle grew gram + cocci in clusters 1out of 4 bottles. These cultures were drawn in the ED. Since admission Pt has not spiked a fever and suspicion for MRSA is low and has responded well to Ceftriaxone. If pt spikes fever will escalate antibiotics. Discussed with on call attending.

## 2017-08-15 NOTE — PROGRESS NOTE ADULT - PROBLEM SELECTOR PLAN 8
- hold HSQ in setting of UGIB, request from HCP to limit injections  - air support boots for pressure ulcer ppx  - admit to [facility] for IV abx P: hold HSQ in setting of UGIB, request from HCP to limit injections, air support boots for pressure ulcer ppx  C: DNR/DNI  Dispo: Continue on [facility] for treatment of severe sepsis 2/2 to UTI and possible Hospice placement pending Palliative consult

## 2017-08-15 NOTE — CONSULT NOTE ADULT - PROBLEM SELECTOR RECOMMENDATION 3
per son despite fairly good appetite of recent pt continues to loose weight.  BMI 10.5  Wt 71.9 kg  Please retest Albumin and pre albumin for accuracy  Consider placing on liquid diet as only one episode of vomiting was observed  Pt goal is for comfort and she is asking for something to eat per son despite fairly good appetite of recent pt continues to loose weight.  BMI 10.5  Wt 24.3 kg  Please retest Albumin and pre albumin for accuracy  Consider placing on liquid diet as only one episode of vomiting was observed  Pt goal is for comfort and she is asking for something to eat

## 2017-08-15 NOTE — PROGRESS NOTE ADULT - ASSESSMENT
Pt is a 89 yo woman with PMHx dementia and strokes who presented with 1 day of vomiting and worsening mental status, admitted to [facility] for treatment of severe urosepsis and possible UGIB. Pt is a 89 yo woman with PMHx dementia and strokes who presented with 1 day of vomiting and worsening mental status, admitted to [facility] for treatment of severe sepsis 2/2 UTI and possible UGIB, currently patient has no tachypneia, tachycardia, hypotension, fever, unknown white count due to minimal blood draw for patient as per HCP s/p 1 dose of Ceftriaxone.

## 2017-08-15 NOTE — CONSULT NOTE ADULT - PROBLEM SELECTOR RECOMMENDATION 4
c/o RLQ pain on light palpation 2/2 UTI vs ? malignancy??  consider Tylenol to start with increase to Morphine 2.5 mg liquid q 4 hr if Tylenol not helping to relieve pain

## 2017-08-15 NOTE — PROGRESS NOTE ADULT - PROBLEM SELECTOR PLAN 1
Pt presented to ED with T 100.9 F, WBC 19 (16% bands), +UA with WBC > 10 and +nitrites, and lactate elevated to 2.5, meeting criteria for severe sepsis with a urinary source of infection and hypoperfusion. No tachycardia, tachypnea, or hypotension. CXR was negative.  - treat with IV ceftriaxone, 1 g x 3 days, f/u urine cultures  - tylenol suppository 650 mg for temp > 100.4  - f/u blood cultures Pt presented to ED with T 100.9 F, WBC 19 (16% bands), +UA with WBC > 10 and +nitrites, and lactate elevated to 2.5, meeting criteria for severe sepsis with a urinary source of infection and hypoperfusion. No tachycardia, tachypnea, or hypotension. CXR was negative.  - treat with IV ceftriaxone, 1 g x 3 days, f/u urine cultures  - tylenol suppository 650 mg for temp>100.4, suppository because patient has poor ability to cooperate with oral medications.   - f/u blood cultures, currently no growth to date  -Continue with IVF 50cc/h will get follow up lactate with next planned blood draw Pt presented to ED with T 100.9 F, WBC 19 (16% bands), +UA with WBC > 10 and +nitrites, and lactate elevated to 2.5, meeting criteria for severe sepsis with a urinary source of infection and hypoperfusion. No tachycardia, tachypnea, or hypotension.   - treat with IV ceftriaxone, 1 g x 3 days, f/u urine cultures  - tylenol suppository 650 mg for temp>100.4, suppository because patient has poor ability to cooperate with oral medications.   - f/u blood cultures, currently no growth to date  -Continue with IVF 50cc/h will get follow up lactate with next planned blood draw  - Switch to oral equivalent of cephtriaxone - NOT LEVOFLOXACIN.  She is amazingly better and okay to send home tomorrow on oral antibiotics

## 2017-08-15 NOTE — CONSULT NOTE ADULT - ASSESSMENT
7F from home with 24HHA, w/PMH of dementia, brought in by HHA with episode of vomiting and worsening mental status, admitted for severe sepsis 2/2 UTI

## 2017-08-15 NOTE — PROGRESS NOTE ADULT - PROBLEM SELECTOR PLAN 6
Pt's son (Shankar) is HCP, has filled out MOLST form, included in pt's chart.   - limit invasive intervention, limit lab draws  - continue antibiotics treatment when medically indicated  - no heroic measures  - DNR/DNI  - palliative consult for possible home hospice Pt's son (Shankar) is HCP, has filled out MOLST form, included in pt's chart.   - limit invasive intervention, limit lab draws  - continue antibiotics treatment when medically indicated  - no heroic measures  - DNR/DNI  - palliative consult placed last night for possible home hospice, awaiting rec's

## 2017-08-15 NOTE — PROGRESS NOTE ADULT - PROBLEM SELECTOR PLAN 4
BUN of 60. Differential includes GI bleeding, protein catabolism 2/2 to poor nutrition (pt appears cachectic), prerenal azotemia 2/2 to hypovolemia (although pt is normotensive, MMM).   - gentle hydration with NS, monitor nutrition  - no f/u lab draws per HCP orders to limit blood draws BUN of 60. Differential includes GI bleeding, protein catabolism 2/2 to poor nutrition (pt appears cachectic), prerenal azotemia 2/2 to hypovolemia (although pt is normotensive, MMM).   - gentle hydration with NS, monitor nutrition, consider Nutrition consult   - no f/u lab draws per HCP orders to limit blood draws

## 2017-08-15 NOTE — PROGRESS NOTE ADULT - PROBLEM SELECTOR PLAN 2
24h home health aide reported 1 episode of "coffee-ground" emesis. Pt has no hx of anticoagulation or NSAID use.  - hb 14.3, within normal limits  - no repeat CBC per HCP orders to limit blood draws  - IV protonix 40 mg BID 24h home health aide reported 1 episode of "coffee-ground" emesis. Pt has no hx of anticoagulation or NSAID use.  - hb 14.3, within normal limits  - no repeat CBC per HCP orders to limit blood draws  - IV protonix 40 mg BID due to potential GI bleed

## 2017-08-15 NOTE — PROGRESS NOTE ADULT - PROBLEM SELECTOR PLAN 3
Pt has hx of dementia, is minimally responsive at baseline. Currently responsive to name and pain only.   - possible confusion in setting of severe sepsis Pt has hx of dementia, is minimally responsive at baseline. Currently responsive to name and pain only.   - possible confusion in setting of severe sepsis  - will continue to monitor mental status with son at bedside to determine if patient has returned to baseline.

## 2017-08-16 ENCOUNTER — TRANSCRIPTION ENCOUNTER (OUTPATIENT)
Age: 82
End: 2017-08-16

## 2017-08-16 DIAGNOSIS — L89.152 PRESSURE ULCER OF SACRAL REGION, STAGE 2: ICD-10-CM

## 2017-08-16 PROCEDURE — 85730 THROMBOPLASTIN TIME PARTIAL: CPT

## 2017-08-16 PROCEDURE — 96374 THER/PROPH/DIAG INJ IV PUSH: CPT

## 2017-08-16 PROCEDURE — 87150 DNA/RNA AMPLIFIED PROBE: CPT

## 2017-08-16 PROCEDURE — 80053 COMPREHEN METABOLIC PANEL: CPT

## 2017-08-16 PROCEDURE — 81001 URINALYSIS AUTO W/SCOPE: CPT

## 2017-08-16 PROCEDURE — 83605 ASSAY OF LACTIC ACID: CPT

## 2017-08-16 PROCEDURE — 99285 EMERGENCY DEPT VISIT HI MDM: CPT | Mod: 25

## 2017-08-16 PROCEDURE — 84134 ASSAY OF PREALBUMIN: CPT

## 2017-08-16 PROCEDURE — 87040 BLOOD CULTURE FOR BACTERIA: CPT

## 2017-08-16 PROCEDURE — 96375 TX/PRO/DX INJ NEW DRUG ADDON: CPT

## 2017-08-16 PROCEDURE — 85610 PROTHROMBIN TIME: CPT

## 2017-08-16 PROCEDURE — 71045 X-RAY EXAM CHEST 1 VIEW: CPT

## 2017-08-16 PROCEDURE — 74018 RADEX ABDOMEN 1 VIEW: CPT

## 2017-08-16 PROCEDURE — 99233 SBSQ HOSP IP/OBS HIGH 50: CPT

## 2017-08-16 PROCEDURE — 36415 COLL VENOUS BLD VENIPUNCTURE: CPT

## 2017-08-16 PROCEDURE — 83735 ASSAY OF MAGNESIUM: CPT

## 2017-08-16 PROCEDURE — 85025 COMPLETE CBC W/AUTO DIFF WBC: CPT

## 2017-08-16 PROCEDURE — 84100 ASSAY OF PHOSPHORUS: CPT

## 2017-08-16 PROCEDURE — 93005 ELECTROCARDIOGRAM TRACING: CPT | Mod: 76

## 2017-08-16 PROCEDURE — 87186 SC STD MICRODIL/AGAR DIL: CPT

## 2017-08-16 PROCEDURE — 87086 URINE CULTURE/COLONY COUNT: CPT

## 2017-08-16 RX ORDER — CEFUROXIME AXETIL 250 MG
500 TABLET ORAL EVERY 12 HOURS
Qty: 0 | Refills: 0 | Status: DISCONTINUED | OUTPATIENT
Start: 2017-08-16 | End: 2017-08-16

## 2017-08-16 RX ORDER — CEFUROXIME AXETIL 250 MG
500 TABLET ORAL EVERY 24 HOURS
Qty: 0 | Refills: 0 | Status: DISCONTINUED | OUTPATIENT
Start: 2017-08-16 | End: 2017-08-17

## 2017-08-16 RX ORDER — CEFUROXIME AXETIL 250 MG
1 TABLET ORAL
Qty: 5 | Refills: 0 | OUTPATIENT
Start: 2017-08-16 | End: 2017-08-21

## 2017-08-16 RX ADMIN — Medication 500 MILLIGRAM(S): at 17:04

## 2017-08-16 NOTE — DISCHARGE NOTE ADULT - CARE PLAN
Principal Discharge DX:	Severe sepsis without septic shock  Goal:	To resolve  Instructions for follow-up, activity and diet:	You were initially admitted with a severe infection due to urinary tract infection. You were treated with Ceftriaxone as an inpatient. It is important to continue Ceftin 500mg q24h as an outpatient for your UTI.  Secondary Diagnosis:	Urinary tract infection without hematuria, site unspecified  Instructions for follow-up, activity and diet:	You were initially admitted with a severe infection due to urinary tract infection. You were treated with Ceftriaxone as an inpatient. It is important to continue Ceftin 500mg q24h as an outpatient for your UTI.  Secondary Diagnosis:	Dementia without behavioral disturbance, unspecified dementia type  Instructions for follow-up, activity and diet:	You have severe dementia making it difficult for you to complete activities of daily living without aid. It is important to follow up with your home hospice services in order to get proper nutrition.  Secondary Diagnosis:	Cachexia  Instructions for follow-up, activity and diet:	You are severely cachetic and protein malnourished. It is important to follow up with your home hospice services in order to get proper nutrition.

## 2017-08-16 NOTE — PROGRESS NOTE ADULT - PROBLEM SELECTOR PLAN 3
Currently A&Ox2, knows name, location in [facility] when prompted.  - resolving confusion from setting of severe sepsis  - will continue to monitor mental status with son at bedside to determine if patient has returned to baseline

## 2017-08-16 NOTE — PROGRESS NOTE ADULT - ASSESSMENT
Pt is a 89 yo woman with PMHx dementia and strokes who presented with 1 day of vomiting and worsening mental status, admitted to [facility] for treatment of severe sepsis 2/2 UTI and possible UGIB. Pt is s/p 2 doses of Ceftriaxone, remains afebrile, without tachypnea, tachycardia, hypotension. Unknown WBC due to minimal blood d raw for patient per HCP.

## 2017-08-16 NOTE — PROGRESS NOTE ADULT - SUBJECTIVE AND OBJECTIVE BOX
89 yo woman with PMHx dementia, was brought in with [facility] with 1 episode of NBNB vomiting, 1 episode of dark, "coffee-ground" appearing vomiting admitted for severe sepsis 2/2 to UTI. Completed 2 doses of IV ceftriaxone, IV protonix.    O/N Events: LA NENA overnight. Anaerobic blood culture bottle grew gram + cocci in clusters 1out of 4 bottles, although patient has spiked no fevers and has been responsive to ceftriaxone.    Subjective/ROS: Pt is A&Ox2, knows name and location, with improved mental status to baseline. She notes feeling "terrible," and complains of abdominal pain. Unable to complete ROS 2/2 to dementia. No melena, hematochezia, or hematemesis per nurse.    VITALS  Vital Signs Last 24 Hrs  T(C): 36.5 (16 Aug 2017 05:11), Max: 36.5 (15 Aug 2017 20:53)  T(F): 97.7 (16 Aug 2017 05:11), Max: 97.7 (15 Aug 2017 20:53)  HR: 71 (16 Aug 2017 05:11) (69 - 71)  BP: 99/67 (16 Aug 2017 05:11) (96/61 - 109/72)  BP(mean): --  RR: 17 (16 Aug 2017 05:11) (16 - 17)  SpO2: 97% (16 Aug 2017 05:11) (96% - 99%)      PHYSICAL EXAM  General: A&Ox2; NAD; cachectic (BMI 10.5)  Head: NC/AT; MMM; PERRL  Neck: Supple; no JVD  Respiratory: CTA B/L; no wheezes/crackles   Cardiovascular: Regular rhythm/rate; S1/S2; III/IV holosystolic murmur most prominent in mitral area  Gastrointestinal: Soft; nondistended; tenderness diffusely in abdomen, most prominent suprapubic   Extremities: WWP; no edema/cyanosis; left knee enlarged; venous stasis dermatitis in all 4 extremities; stage I pressure ulcer on left and right heels  Neurological:  left eye remained closed    MEDICATIONS  (STANDING):  sodium chloride 0.9%. 1000 milliLiter(s) (50 mL/Hr) IV Continuous <Continuous>  cefuroxime   Tablet 500 milliGRAM(s) Oral every 24 hours    MEDICATIONS  (PRN):  acetaminophen  Suppository 650 milliGRAM(s) Rectal every 6 hours PRN For Temp greater than 38 C (100.4 F)      Levaquin (Rash)  penicillins (Short breath)      LABS                        14.3   19.0  )-----------( 223      ( 14 Aug 2017 15:48 )             42.3         143  |  96  |  60<H>  ----------------------------<  178<H>  4.9   |  29  |  1.00    Ca    9.1      14 Aug 2017 15:48  Phos  4.9       Mg     2.4         TPro  7.2  /  Alb  3.9  /  TBili  1.0  /  DBili  x   /  AST  41<H>  /  ALT  31  /  AlkPhos  105      PT/INR - ( 14 Aug 2017 15:48 )   PT: 12.1 sec;   INR: 1.09          PTT - ( 14 Aug 2017 15:48 )  PTT:26.5 sec  Urinalysis Basic - ( 14 Aug 2017 18:26 )    Color: Yellow / Appearance: Hazy / S.025 / pH: x  Gluc: x / Ketone: Trace mg/dL  / Bili: Moderate / Urobili: 1.0 E.U./dL   Blood: x / Protein: 30 mg/dL / Nitrite: POSITIVE   Leuk Esterase: NEGATIVE / RBC: < 5 /HPF / WBC > 10 /HPF   Sq Epi: x / Non Sq Epi: Few /HPF / Bacteria: Present /HPF      Prealbumin, Serum (08.15.17 @ 16:03)    Prealbumin, Serum: 10 mg/dL

## 2017-08-16 NOTE — PROGRESS NOTE ADULT - PROBLEM SELECTOR PLAN 3
Pre Albumin of 8/15 10, appears more inline with pt present clinical presentation  BMI: 10.5  Wt 24.3 kg  Tolerating liquid diet without emesis. Consider increasing to regular diet as cindy

## 2017-08-16 NOTE — PROGRESS NOTE ADULT - PROBLEM SELECTOR PLAN 5
Glucose was 178, no hx of diabetes. Likely hyperglycemia in the setting of infection.  - no followup finger sticks per improving mental status, and per surrogate request to limit invasive measures  - continue to treat infection, continue appropriate nutrition  - no sliding scale at this time, will monitor glucose level and determine if needed.

## 2017-08-16 NOTE — PROGRESS NOTE ADULT - PROBLEM SELECTOR PLAN 7
Pt's son (Shankar) is legal surrogate, has filled out MOLST form, included in pt's chart.   - limit invasive intervention, limit lab draws  - continue antibiotics treatment when medically indicated  - no heroic measures  - DNR/DNI  -  Palliative care info/counseling provided, [facility] referral by palliative consult

## 2017-08-16 NOTE — PROGRESS NOTE ADULT - PROBLEM SELECTOR PLAN 4
BUN of 60. Differential includes GI bleeding, protein catabolism 2/2 to poor nutrition (pt appears cachectic), prerenal azotemia 2/2 to hypovolemia (although pt is normotensive, MMM).   - Severe protein calorie malnutrition per Nutrition consult   - no f/u lab draws per surrogate request to limit blood draws.

## 2017-08-16 NOTE — PROGRESS NOTE ADULT - PROBLEM SELECTOR PLAN 6
likely 2/2 bedbound status and urinary/fecal incontinence  consider wound care eval to offer recommendations  with goal to contain wound and prevent infection

## 2017-08-16 NOTE — DISCHARGE NOTE ADULT - SECONDARY DIAGNOSIS.
Urinary tract infection without hematuria, site unspecified Dementia without behavioral disturbance, unspecified dementia type Cachexia

## 2017-08-16 NOTE — PROGRESS NOTE ADULT - PROBLEM SELECTOR PLAN 9
P: hold HSQ in setting of UGIB, request from HCP to limit injections, air support boots for pressure ulcer ppx  C: DNR/DNI  Dispo: Continue on RMF for treatment of severe sepsis 2/2 to UTI and possible Hospice placement pending Palliative consult.

## 2017-08-16 NOTE — PROGRESS NOTE ADULT - PROBLEM SELECTOR PLAN 1
Pt presented to ED with T 100.9 F, WBC 19 (16% bands), +UA with WBC > 10 and +nitrites, and lactate elevated to 2.5, meeting criteria for severe sepsis with a urinary source of infection and hypoperfusion. No tachycardia, tachypnea, or hypotension.   - Completed 2/3 days IV ceftriaxone, 1 g; f/u urine cultures  - Switch to oral equivalent of cefuroxime due to functional improvement (improved mental status, afebrile, no tachycardia, tachypnea, hypotension)  - Tylenol suppository 650 mg for temp>100.4, suppository because patient has poor ability to cooperate with oral medications.   - anaerobic blood culture bottle grew Coag Neagtive Staph, most likely contaminant, no intervention. Since admission, pt has not spiked a fever has responded well to Ceftriaxone, and suspicion for MRSA is low. If pt spikes fever will escalate antibiotics.   - D/C IVF 50cc/h due to improved status  - no f/u lactate per surrogate's request to limit blood draws

## 2017-08-16 NOTE — PROGRESS NOTE ADULT - PROBLEM SELECTOR PLAN 2
24h home health aide reported 1 episode of "coffee-ground" emesis. Pt has no hx of anticoagulation or NSAID use  - hb on admission 14.3, within normal limits  - no repeat CBC per surrogate orders to limit blood draws  - D/C IV Protonix due to absence of melena, hematochezia, hematemesis

## 2017-08-16 NOTE — DISCHARGE NOTE ADULT - PLAN OF CARE
To resolve You were initially admitted with a severe infection due to urinary tract infection. You were treated with Ceftriaxone as an inpatient. It is important to continue Ceftin 500mg q24h as an outpatient for your UTI. You are severely cachetic and protein malnourished. It is important to follow up with your home hospice services in order to get proper nutrition. You have severe dementia making it difficult for you to complete activities of daily living without aid. It is important to follow up with your home hospice services in order to get proper nutrition.

## 2017-08-16 NOTE — DISCHARGE NOTE ADULT - MEDICATION SUMMARY - MEDICATIONS TO STOP TAKING
I will STOP taking the medications listed below when I get home from the hospital:    Levaquin 500 mg oral tablet  -- 1 tab(s) by mouth once a day  -- Avoid prolonged or excessive exposure to direct and/or artificial sunlight while taking this medication.  Do not take dairy products, antacids, or iron preparations within one hour of this medication.  Finish all this medication unless otherwise directed by prescriber.  May cause drowsiness or dizziness.  Medication should be taken with plenty of water.

## 2017-08-16 NOTE — PROGRESS NOTE ADULT - SUBJECTIVE AND OBJECTIVE BOX
CURLY GARICA   MRN-[id number]    CC: UTI, abdominal pain     HPI:  89F from home with 24HHA, w/PMH of dementia, brought in by HHA with episode of vomiting. Per ED documentation, patient had an episode of NBNB vomiting yesterday and then today noted to be dark in appearance/coffee ground like.  Patient unable to provide any further history due to dementia. Per sonShankar (HCP) pt has had a few strokes in the past and has been bedbound for the past 2 years, poor quality of life. He would like the patient to receive IV antibiotics, however no aggressive measures. Pt is DNR/DNI.     Overnight anaerobic blood culture bottle grew gram + cocci in clusters 1out of 4 bottles, pt afebrile and responding well  to ceftriaxone.    Subjective: pt alert with confusion. Per HHA mentating at baseline. c/o diffuse right sided abdominal discomfort    DYSPNEA: Y  Lyle 2/10	  NAUS/VOM: N	  SECRETIONS:  N	  AGITATION: N  Pain (Y/N): diffuse right sided abdominal discomfort   -Provocation/Palliation: spontaneoust  -Quality/Quantity: dull, squeezing  -Radiating:none  -Severity :moderate FLACC 4  -Timing/Frequency: intermittent  -Impact on ADLs: debility    OTHER REVIEW OF SYSTEMS:  UNABLE TO OBTAIN  due to: advanced dementia    PEx:  T(C): 36.4 (08-16-17 @ 08:23), Max: 36.5 (08-15-17 @ 20:53)  HR: 62 (08-16-17 @ 08:23) (62 - 71)  BP: 99/67 (08-16-17 @ 05:11) (96/61 - 99/67)  RR: 19 (08-16-17 @ 08:23) (16 - 19)  SpO2: 97% (08-16-17 @ 08:23) (96% - 98%)  Wt(kg): --24.3    General: elderly cachectic female in bed c/o RLQ   HEENT: A/T N/C, poor dentition, [place], MM dry with tongue cracked   Neck: supple, no JVD  CVS: S1S2, III/VI  holosystolic murmur most prominent at apex, no gallop  Resp: CTA B/L no RRW   GI: soft, mild diffuse enderness, + BS x 4    : + suprapubic tenderness   Musc: weakness    Neuro: alert to  place states her name is Shankar,  no focal deficits  Psych: calm, confused, combative at times     Skin: B/L UE/LE eccyhymosis, dsg to left knee, sacral decubiti covered with dsg  Lymph: NO LAD     	     Levaquin (Rash)  penicillins (Short breath)      OPIATE NAÏVE (Y/N): Y      MEDICATIONS: REVIEWED  MEDICATIONS  (STANDING):  sodium chloride 0.9%. 1000 milliLiter(s) (50 mL/Hr) IV Continuous <Continuous>  cefuroxime   Tablet 500 milliGRAM(s) Oral every 24 hours    MEDICATIONS  (PRN):  acetaminophen  Suppository 650 milliGRAM(s) Rectal every 6 hours PRN For Temp greater than 38 C (100.4 F)      LABS: 08-15    Pre Albumin 10    IMAGING: No new imaging    Advanced Directives:     DNR/DNI     MOLST      Decision maker: radames Pisano (o) [phone] or [phone] (c):[phone]  Legal surrogate: radames Pisano       GOALS OF CARE DISCUSSION       Palliative care info/counseling provided to radames leal	           Documentation of GOC: for Daria FIELDS referral           REFERRALS	        Palliative Med        Unit SW/Case Mgmt       Hospice       Nutrition

## 2017-08-16 NOTE — PROGRESS NOTE ADULT - PROBLEM SELECTOR PLAN 1
Overnight anaerobic blood culture bottle grew gram + cocci in clusters 1out of 4 bottles, pt afebrile and responding well  to ceftriaxone.  -given goals of care for comfort continue present regimen per medical team

## 2017-08-16 NOTE — DISCHARGE NOTE ADULT - HOSPITAL COURSE
89F with PMH of dementia who was admitted for severe sepsis secondary to a UTI. Patient was given Normal Saline at a rate of 50cc/h overnight with resolution of sepsis. Patient was DNR/DNI on arrival and minimal blood draws were performed. Her mental status improved but patient showed no ability to be able to provide for ADLs. Patient was severely cachetic, despite what her surrogate describes as a good appetite. Nutrition labs show the patient in poor nutritional status. Plans were made to discharge the patient with home hospice in order to provide comfort care.

## 2017-08-16 NOTE — DISCHARGE NOTE ADULT - CARE PROVIDER_API CALL
Santos Corona), Cardiovascular Disease; Internal Medicine  [address] [place] [address]  Phone: [phone]  Fax: [phone]

## 2017-08-16 NOTE — DISCHARGE NOTE ADULT - PATIENT PORTAL LINK FT
“You can access the FollowHealth Patient Portal, offered by [facility], by registering with the following website: [email or web address]”

## 2017-08-16 NOTE — PROGRESS NOTE ADULT - PROBLEM SELECTOR PLAN 4
continues to complain of intermittent dull, cramping Right sided abdominal pain.  Last BM 8/14   consider Tylenol with increase to low dose morphine if ineffective

## 2017-08-16 NOTE — PROGRESS NOTE ADULT - PROBLEM SELECTOR PLAN 6
Pt cachectic, with BMI 10.5, weight 24.3 kg, prealbumin 10.  - Severe protein calorie malnutrition per nutrition consult  - continue regular nutrition per goals of care

## 2017-08-17 VITALS
RESPIRATION RATE: 18 BRPM | OXYGEN SATURATION: 97 % | DIASTOLIC BLOOD PRESSURE: 77 MMHG | TEMPERATURE: 98 F | HEART RATE: 83 BPM | SYSTOLIC BLOOD PRESSURE: 115 MMHG

## 2017-08-17 LAB
-  CEFAZOLIN: SIGNIFICANT CHANGE UP
-  CLINDAMYCIN: SIGNIFICANT CHANGE UP
-  ERYTHROMYCIN: SIGNIFICANT CHANGE UP
-  LINEZOLID: SIGNIFICANT CHANGE UP
-  OXACILLIN: SIGNIFICANT CHANGE UP
-  PENICILLIN: SIGNIFICANT CHANGE UP
-  RIFAMPIN: SIGNIFICANT CHANGE UP
-  TRIMETHOPRIM/SULFAMETHOXAZOLE: SIGNIFICANT CHANGE UP
-  VANCOMYCIN: SIGNIFICANT CHANGE UP
METHOD TYPE: SIGNIFICANT CHANGE UP

## 2017-08-17 PROCEDURE — 99233 SBSQ HOSP IP/OBS HIGH 50: CPT

## 2017-08-17 RX ORDER — CEFUROXIME AXETIL 250 MG
1 TABLET ORAL
Qty: 3 | Refills: 0 | OUTPATIENT
Start: 2017-08-17 | End: 2017-08-20

## 2017-08-17 NOTE — PROGRESS NOTE ADULT - SUBJECTIVE AND OBJECTIVE BOX
CURLY GARCIA   MRN-[id number]    CC: UTI, abdominal pain     HPI:  89F from home with 24HHA, w/PMH of dementia, brought in by HHA with episode of vomiting. Per ED documentation, patient had an episode of NBNB vomiting yesterday and then today noted to be dark in appearance/coffee ground like.  Patient unable to provide any further history due to dementia. Per sonShankar (HCP) pt has had a few strokes in the past and has been bedbound for the past 2 years, poor quality of life. He would like the patient to receive IV antibiotics, however no aggressive measures. Pt is DNR/DNI.     Overnight anaerobic blood culture bottle grew gram + cocci in clusters 1out of 4 bottles, pt afebrile and responding well  to ceftriaxone.    Subjective: pt alert with confusion, mentating at baseline. c/o diffuse right sided abdominal discomfort      DYSPNEA: Y  Lyle 2/10	  NAUS/VOM: N	  SECRETIONS:  N	  AGITATION: N  Pain c/o : diffuse right sided abdominal discomfort   -Provocation/Palliation: spontaneous  -Quality/Quantity: dull, squeezing  -Radiating:none  -Severity :moderate FLACC 2  -Timing/Frequency: intermittent  -Impact on ADLs: increasing debility    OTHER REVIEW OF SYSTEMS:  UNABLE TO OBTAIN  due to: advanced dementia    PEx:  T(C): 36.7 (08-17-17 @ 10:30), Max: 36.7 (08-17-17 @ 10:30)  HR: 83 (08-17-17 @ 10:30) (74 - 83)  BP: 115/77 (08-17-17 @ 10:30) (110/69 - 133/81)  RR: 18 (08-17-17 @ 10:30) (16 - 18)  SpO2: 97% (08-17-17 @ 10:30) (95% - 97%)  Wt(kg): --    General: elderly cachectic female in bed c/o RLQ   HEENT: A/T N/C, poor dentition, [place], MM dryNeck: supple, no JVD  CVS: S1S2, III/VI  holosystolic murmur most prominent at apex, no gallop  Resp: CTA B/L no RRW   GI: soft, mild diffuse enderness, + BS x 4    : + suprapubic tenderness   Musc: weakness    Neuro: alert to  place states her name is Shankar,  no focal deficits  Psych: calm, confused, combative at times     Skin: B/L UE/LE eccyhymosis, dsg to left knee, sacral decubiti covered with dsg  Lymph: NO LAD     Levaquin (Rash)  penicillins (Short breath)      OPIATE NAÏVE (Y/N): Y      MEDICATIONS: REVIEWED  MEDICATIONS  (STANDING):  sodium chloride 0.9%. 1000 milliLiter(s) (50 mL/Hr) IV Continuous <Continuous>  cefuroxime   Tablet 500 milliGRAM(s) Oral every 24 hours    MEDICATIONS  (PRN):  acetaminophen  Suppository 650 milliGRAM(s) Rectal every 6 hours PRN For Temp greater than 38 C (100.4 F)      LABS: no new labs    IMAGING: no new imaging     Advanced Directives:     DNR/DNI     MOLST      Decision maker: radames Pisano (o) [phone] or [phone] (c):[phone]  Legal surrogate: radames Pisano       GOALS OF CARE DISCUSSION       Palliative care info/counseling provided to radames leal	           Documentation of GOC: for Daria  referral           REFERRALS	        Palliative Med        Unit SW/Case Mgmt       Hospice       Nutrition

## 2017-08-17 NOTE — PROGRESS NOTE ADULT - PROBLEM SELECTOR PROBLEM 6
Decubitus ulcer of sacral region, stage 2
Decubitus ulcer of sacral region, stage 2
Severe protein-calorie malnutrition
Medical orders for life-sustaining treatment (MOLST) form in chart

## 2017-08-17 NOTE — PROGRESS NOTE ADULT - PROBLEM SELECTOR PLAN 9
pt sched for D/C home with  services this afternoon. spoke with hospice liasion to discuss need for further pain assessment once pt home

## 2017-08-17 NOTE — PROGRESS NOTE ADULT - PROBLEM SELECTOR PROBLEM 1
Urinary tract infection without hematuria, site unspecified
Urinary tract infection without hematuria, site unspecified
Severe sepsis without septic shock
Severe sepsis without septic shock

## 2017-08-17 NOTE — PROGRESS NOTE ADULT - PROBLEM SELECTOR PROBLEM 7
Coffee ground emesis
Coffee ground emesis
Advance care planning
Nutrition, metabolism, and development symptoms

## 2017-08-17 NOTE — ADVANCED PRACTICE NURSE CONSULT - ASSESSMENT
Patient presented with multiple stage 1 pressure ulcers (injuries) and LLE anterior tibial wound. Right heel stage 1 pressure ulcers (injury) measuring 2 cm x 3 cm, left lateral heel stage 1 pressure ulcers (injury) measuring 2 cm x 3 cm and sacral stage 1 pressure ulcers (injury) measuring approximately 3 cm x 3 cm. LLE anterior tibial wound with red non-granulating tissue measuring 1.5 cm x 0.5 cm x 0.1 cm; peristomal skin friable.   Patient requires one person assist to turn and reposition while in bed. Az guero pillow is being used to turn and reposition patient in bed and heel protectors to offload heels. Also, placed pillow between knees. Patient presented with multiple stage 1 pressure ulcers (injuries) and RLE anterior tibial wound. Right heel stage 1 pressure ulcers (injury) measuring 2 cm x 3 cm, left lateral heel stage 1 pressure ulcers (injury) measuring 2 cm x 3 cm and sacral stage 1 pressure ulcers (injury) measuring approximately 3 cm x 3 cm. RLE anterior tibial wound with red non-granulating tissue measuring 1.5 cm x 0.5 cm x 0.1 cm; peristomal skin friable.   Patient requires one person assist to turn and reposition while in bed. A z guero pillow is being used to turn and reposition patient in bed and heel protectors to offload heels. Also, placed pillow between knees.

## 2017-08-17 NOTE — ADVANCED PRACTICE NURSE CONSULT - REASON FOR CONSULT
[facility] nurse consult for 89F from home with 24HHA, w/PMH of dementia, brought in by HHA with episode of vomiting. Per ED documentation, patient had an episode of NBNB vomiting yesterday and then noted to be dark in appearance/coffee ground like.  Patient unable to provide any further history due to dementia. Per sonShankar (HCP) pt has had a few strokes in the past and has been bedbound for the past 2 years.  Pt is DNR/DNI.

## 2017-08-17 NOTE — PROGRESS NOTE ADULT - PROBLEM SELECTOR PLAN 1
- responding well  to ceftriaxone.  -given goals of care for comfort continue present regimen per medical team

## 2017-08-17 NOTE — PROGRESS NOTE ADULT - PROBLEM SELECTOR PROBLEM 8
Palliative care by specialist
Palliative care by specialist
Nutrition, metabolism, and development symptoms
Prophylactic measure

## 2017-08-17 NOTE — ADVANCED PRACTICE NURSE CONSULT - RECOMMEDATIONS
Continue use of z guero pillow, heel protectors and pillow between knees.  LLE wound - cleanse with saline, apply zinc based moisture barrier cream, Telfa and jacque Monday/Wed/Friday.  Discussed assessment and recommendations with Liza WALTON and house staff.

## 2017-08-18 LAB
-  AMPICILLIN: SIGNIFICANT CHANGE UP
-  CIPROFLOXACIN: SIGNIFICANT CHANGE UP
-  NITROFURANTOIN: SIGNIFICANT CHANGE UP
-  TETRACYCLINE: SIGNIFICANT CHANGE UP
CULTURE RESULTS: SIGNIFICANT CHANGE UP
METHOD TYPE: SIGNIFICANT CHANGE UP
ORGANISM # SPEC MICROSCOPIC CNT: SIGNIFICANT CHANGE UP
ORGANISM # SPEC MICROSCOPIC CNT: SIGNIFICANT CHANGE UP
SPECIMEN SOURCE: SIGNIFICANT CHANGE UP

## 2017-08-19 LAB
CULTURE RESULTS: SIGNIFICANT CHANGE UP
SPECIMEN SOURCE: SIGNIFICANT CHANGE UP

## 2017-08-21 DIAGNOSIS — E43 UNSPECIFIED SEVERE PROTEIN-CALORIE MALNUTRITION: ICD-10-CM

## 2017-08-21 DIAGNOSIS — N39.0 URINARY TRACT INFECTION, SITE NOT SPECIFIED: ICD-10-CM

## 2017-08-21 DIAGNOSIS — R73.9 HYPERGLYCEMIA, UNSPECIFIED: ICD-10-CM

## 2017-08-21 DIAGNOSIS — Z74.01 BED CONFINEMENT STATUS: ICD-10-CM

## 2017-08-21 DIAGNOSIS — R65.21 SEVERE SEPSIS WITH SEPTIC SHOCK: ICD-10-CM

## 2017-08-21 DIAGNOSIS — Z51.5 ENCOUNTER FOR PALLIATIVE CARE: ICD-10-CM

## 2017-08-21 DIAGNOSIS — F03.90 UNSPECIFIED DEMENTIA, UNSPECIFIED SEVERITY, WITHOUT BEHAVIORAL DISTURBANCE, PSYCHOTIC DISTURBANCE, MOOD DISTURBANCE, AND ANXIETY: ICD-10-CM

## 2017-08-21 DIAGNOSIS — R53.2 FUNCTIONAL QUADRIPLEGIA: ICD-10-CM

## 2017-08-21 DIAGNOSIS — Z88.0 ALLERGY STATUS TO PENICILLIN: ICD-10-CM

## 2017-08-21 DIAGNOSIS — A41.9 SEPSIS, UNSPECIFIED ORGANISM: ICD-10-CM

## 2017-08-21 DIAGNOSIS — L89.152 PRESSURE ULCER OF SACRAL REGION, STAGE 2: ICD-10-CM

## 2017-08-21 DIAGNOSIS — Z88.1 ALLERGY STATUS TO OTHER ANTIBIOTIC AGENTS STATUS: ICD-10-CM

## 2017-08-21 DIAGNOSIS — Z66 DO NOT RESUSCITATE: ICD-10-CM

## 2017-08-22 LAB
CULTURE RESULTS: SIGNIFICANT CHANGE UP
ORGANISM # SPEC MICROSCOPIC CNT: SIGNIFICANT CHANGE UP
SPECIMEN SOURCE: SIGNIFICANT CHANGE UP

## 2023-01-06 NOTE — PATIENT PROFILE ADULT. - TEACHING/LEARNING LEARNING PREFERENCES
[FreeTextEntry3] : I personally saw and examined Ms. ANA MICHEL in detail this visit today. I personally reviewed the HPI, PMH, FH, SH, ROS and medications/allergies. I have spoken to MALU Werner regarding the history and have personally determined the assessment and plan of care, and documented this myself. I was present and participated in all key portions of the encounter and all procedures noted above. I have made changes in the body of the note where appropriate.\par \par Attesting Faculty: See Attending Signature Below  written material/verbal instruction
